# Patient Record
Sex: FEMALE | Race: BLACK OR AFRICAN AMERICAN | Employment: OTHER | URBAN - METROPOLITAN AREA
[De-identification: names, ages, dates, MRNs, and addresses within clinical notes are randomized per-mention and may not be internally consistent; named-entity substitution may affect disease eponyms.]

---

## 2017-08-10 DIAGNOSIS — I10 ESSENTIAL HYPERTENSION: ICD-10-CM

## 2017-08-10 RX ORDER — CARVEDILOL 3.12 MG/1
TABLET ORAL
Qty: 60 TAB | Refills: 10 | Status: SHIPPED | OUTPATIENT
Start: 2017-08-10 | End: 2018-11-08 | Stop reason: SDUPTHER

## 2017-08-17 ENCOUNTER — OFFICE VISIT (OUTPATIENT)
Dept: CARDIOLOGY CLINIC | Age: 72
End: 2017-08-17

## 2017-08-17 VITALS
BODY MASS INDEX: 26.24 KG/M2 | SYSTOLIC BLOOD PRESSURE: 160 MMHG | DIASTOLIC BLOOD PRESSURE: 98 MMHG | WEIGHT: 139 LBS | OXYGEN SATURATION: 96 % | HEIGHT: 61 IN | HEART RATE: 82 BPM

## 2017-08-17 DIAGNOSIS — I10 ESSENTIAL HYPERTENSION: Primary | ICD-10-CM

## 2017-08-17 RX ORDER — METFORMIN HYDROCHLORIDE 500 MG/1
TABLET ORAL
Refills: 0 | COMMUNITY
Start: 2017-07-09 | End: 2019-11-08

## 2017-08-17 NOTE — PROGRESS NOTES
1. Have you been to the ER, urgent care clinic since your last visit? Hospitalized since your last visit? No    2. Have you seen or consulted any other health care providers outside of the [address] [place] [address] since your last visit? Include any pap smears or colon screening.  No

## 2017-08-17 NOTE — PROGRESS NOTES
As you know, Hammad Orozco is a 67 y.o. female with a history of coronary artery disease, status post inferior ST elevation MI and RCA drug-eluting stent in October 2011. She also has borderline diabetes, hyperlipidemia and hypertension. Ms. Sarah Butler is here today for follow up appointment. She denies any symptoms to suggest angina or heart failure. She denies any palpitations, presyncope or syncope. She takes her medications regularly. She says her blood pressure at home usually runs normal.  She has been started on Metformin because of diabetes. MH:  Past Medical History:   Diagnosis Date    Borderline diabetes     Diet controlled    CAD (coronary artery disease) 10/2011    S/P RCA GOLDIE (3.0 X 18 mm Xience) 10/2011    HLD (hyperlipidemia)     HTN (hypertension)     STEMI (ST elevation myocardial infarction) ([facility] [address]     Inferior STEMI (10/2011)     PSH:  Past Surgical History:   Procedure Laterality Date    CARDIAC CATHETERIZATION  10/27/2011    L heart cath;LAD 10%-20% luminal irreg; 99%stenosis of RCA using 3.0 x18-mm Xience     MEDS:  Current Outpatient Prescriptions   Medication Sig Dispense Refill    metFORMIN (GLUCOPHAGE) 500 mg tablet   0    carvedilol (COREG) 3.125 mg tablet take 1 tablet by mouth twice a day with meals 60 Tab 10    nitroglycerin (NITROSTAT) 0.4 mg SL tablet 1 Tab by SubLINGual route every five (5) minutes as needed for Chest Pain. 1 Bottle 1    atorvastatin (LIPITOR) 40 mg tablet Take 1 Tab by mouth daily. 30 Tab 11    amLODIPine (NORVASC) 5 mg tablet Take 1 Tab by mouth daily. 30 Tab 9    aspirin delayed-release 81 mg tablet Take  by mouth daily.          Allergies and Sensitivities:  Allergies   Allergen Reactions    Pcn [Penicillins] Unknown (comments)     Family History:  Family History   Problem Relation Age of Onset    Heart Attack Mother     Heart Attack Father      Social History:  Social History   Substance Use Topics    Smoking status: Former Smoker Packs/day: 0.50     Years: 30.00     Quit date: 10/16/2011    Smokeless tobacco: Never Used    Alcohol use No     Review of Systems:  As above in HPI, otherwise 10 point review negative. Physical Exam:  BP Readings from Last 3 Encounters:   08/17/17 (!) 160/98   08/16/16 139/83   08/18/15 140/90       Pulse Readings from Last 3 Encounters:   08/17/17 82   08/16/16 89   08/18/15 85       Wt Readings from Last 3 Encounters:   08/17/17 139 lb (63 kg)   08/16/16 139 lb (63 kg)   08/18/15 136 lb (61.7 kg)     Constitutional: Oriented to person, place, and time. HENT: Head: Normocephalic and atraumatic. Neck: No JVD present. Cardiovascular: Regular rhythm. No murmur, gallop or rubs appriciated  Lung[de-identified] Breath sounds normal. No respiratory distress. No ronchi or rales appriciated  Abdominal: No tenderness. No rebound and no guarding. Musculoskeletal: There is no edema. No cynosis    Review of Data:  EKG:   Sinus rhythm at the rate of 75 beats per minute. There was Q wave in lead III. No dynamic ST-T changes of ischemia. LABS:   Lab Results   Component Value Date/Time    Sodium 143 02/22/2012 12:00 AM    Potassium 4.6 02/22/2012 12:00 AM    Chloride 107 02/22/2012 12:00 AM    CO2 26 02/22/2012 12:00 AM    Glucose 108 02/22/2012 12:00 AM    BUN 11 02/22/2012 12:00 AM    Creatinine 0.57 02/22/2012 12:00 AM       Lab Results   Component Value Date/Time    Cholesterol, total 183 10/28/2011 01:20 AM    HDL Cholesterol 36 10/28/2011 01:20 AM    LDL, calculated 121.4 10/28/2011 01:20 AM    Triglyceride 128 10/28/2011 01:20 AM    CHOL/HDL Ratio 5.1 10/28/2011 01:20 AM       No results found for: GPT    Lab Results   Component Value Date/Time    Hemoglobin A1c 6.4 10/28/2011 01:20 AM     FLP (12/2011) at PCP  / TG 94, LDL 62, HDL 42    ECHO:(10/2011)  Findings suggest single vessel coronary artery disease in the territory of  the right coronary artery. There was no significant valvular heart disease. SUMMARY:  Left ventricle: Size was normal. Systolic function was normal. Ejection  fraction was estimated in the range of 60 % to 65 %. There was hypokinesis  of the basal inferoseptal and basal inferior wall(s). Wall thickness was  mildly increased. Right ventricle: Systolic pressure was at the upper limits of normal.    Estimated peak pressure was in the range of 25 mmHg to 30 mmHg. Left atrium: The atrium was mildly dilated. Pericardium: No significant pericardial effusion identified. Features were  not consistent with tamponade physiology. CATHETERIZATION:(10/2011)  1. Left Main: Long, but angiographically normal.    2. LAD: 10% to 20% luminal irregularities into the proximal mid portion. 3. LCX: Very tortuous vessel, but no significant stenosis. 4. RCA: A proximal subtotal 99% occlusion with angiographic concern of  dissection. Successful percutaneous coronary intervention and stenting of  the proximal 99% RCA stenosis using 3.0 x 18-mm Xience drug eluting stent,  followed by a 3.25 x 15 mm noncompliant post dilation. Repeat post dilation with a 4.0 x 12-mm compliant balloon at 14 atmospheres. No angiographic  stenosis or dissection post procedure. Impression / Plan:  Ms. Merissa Moreno is a pleasant, 67 y.o. female with history of coronary artery disease, hypertension, hyperlipidemia, and borderline diabetes. Coronary artery disease:  She has an inferior STEMI in October of 2011 requiring RCA drug-eluting stent. No angina. Continue same. She is on ASA, statin and BB. No use of S/L Since last time. Hypertension:  Blood pressure is slightly up today. On coreg and BB. DW  and she had hyperkalemia from lisinopril in past.  She will see her in 2-3 weeks and if BP still up, dose will be titrated    Hyperlipidemia:  Defer to primary care Continue same for now. She is on atorvastatin 40 mg daily. No side effect of medication. This plan was discussed with Ms. Merissa Moreno in detail, who is in agreement. Thank you Dr. Bita Sagastume for allowing me to participate in the patient's care. Feel free to call me with any questions or concerns.

## 2017-08-17 NOTE — MR AVS SNAPSHOT
Visit Information Date & Time Provider Department Dept. Phone Encounter #  
 8/17/2017  8:45 AM Selvin Payne MD [address] [place] [address] Specialist at [facility] [phone] [id number] Follow-up Instructions Return in about 1 year (around 8/17/2018). Upcoming Health Maintenance Date Due DTaP/Tdap/Td series (1 - Tdap) 1/8/1965 BREAST CANCER SCRN MAMMOGRAM 1/8/1994 FOBT Q 1 YEAR AGE 50-75 1/8/1994 ZOSTER VACCINE AGE 60> 11/8/2003 GLAUCOMA SCREENING Q2Y 1/8/2009 OSTEOPOROSIS SCREENING (DEXA) 1/8/2009 Pneumococcal 65+ Low/Medium Risk (1 of 2 - PCV13) 1/8/2009 MEDICARE YEARLY EXAM 1/8/2009 INFLUENZA AGE 9 TO ADULT 8/1/2017 Allergies as of 8/17/2017  Review Complete On: 8/17/2017 By: Tabby Kaur LPN Severity Noted Reaction Type Reactions Pcn [Penicillins]  11/16/2011    Unknown (comments) Current Immunizations  Never Reviewed No immunizations on file. Not reviewed this visit You Were Diagnosed With   
  
 Codes Comments Essential hypertension    -  Primary ICD-10-CM: I10 
ICD-9-CM: 401.9 Vitals BP Pulse Height(growth percentile) Weight(growth percentile) SpO2 BMI  
 (!) 160/98 82 5' 1\" (1.549 m) 139 lb (63 kg) 96% 26.26 kg/m2 OB Status Smoking Status Menopause Former Smoker BMI and BSA Data Body Mass Index Body Surface Area  
 26.26 kg/m 2 1.65 m 2 Preferred Pharmacy Pharmacy Name Phone [address] [address] [address] [place] [address] [phone] Your Updated Medication List  
  
   
This list is accurate as of: 8/17/17  9:16 AM.  Always use your most recent med list. amLODIPine 5 mg tablet Commonly known as:  Blake Alida Take 1 Tab by mouth daily. aspirin delayed-release 81 mg tablet Take  by mouth daily. atorvastatin 40 mg tablet Commonly known as:  LIPITOR Take 1 Tab by mouth daily. carvedilol 3.125 mg tablet Commonly known as:  COREG  
take 1 tablet by mouth twice a day with meals  
  
 metFORMIN 500 mg tablet Commonly known as:  GLUCOPHAGE  
  
 nitroglycerin 0.4 mg SL tablet Commonly known as:  NITROSTAT  
1 Tab by SubLINGual route every five (5) minutes as needed for Chest Pain. We Performed the Following AMB POC EKG ROUTINE W/ 12 LEADS, INTER & REP [36603 CPT(R)] Follow-up Instructions Return in about 1 year (around 8/17/2018). Introducing [facility] & HEALTH SERVICES! Dear Annette Blankenship: 
Thank you for requesting a [employer] account. Our records indicate that you have previously registered for a [employer] account but its currently inactive. Please call our [employer] support line at [phone]. Additional Information If you have questions, please visit the Frequently Asked Questions section of the [employer] website at [email or web address] Remember, [employer] is NOT to be used for urgent needs. For medical emergencies, dial 911. Now available from your iPhone and Android! Please provide this summary of care documentation to your next provider. Your primary care clinician is listed as Liv Lomax. If you have any questions after today's visit, please call [phone].

## 2017-08-28 RX ORDER — ATORVASTATIN CALCIUM 40 MG/1
TABLET, FILM COATED ORAL
Qty: 30 TAB | Refills: 11 | Status: SHIPPED | OUTPATIENT
Start: 2017-08-28 | End: 2018-04-17 | Stop reason: SDUPTHER

## 2018-02-15 ENCOUNTER — OFFICE VISIT (OUTPATIENT)
Dept: CARDIOLOGY CLINIC | Age: 73
End: 2018-02-15

## 2018-02-15 VITALS
HEART RATE: 97 BPM | DIASTOLIC BLOOD PRESSURE: 82 MMHG | OXYGEN SATURATION: 96 % | SYSTOLIC BLOOD PRESSURE: 148 MMHG | BODY MASS INDEX: 26.06 KG/M2 | WEIGHT: 138 LBS | HEIGHT: 61 IN

## 2018-02-15 DIAGNOSIS — I25.10 CORONARY ARTERY DISEASE DUE TO LIPID RICH PLAQUE: Primary | ICD-10-CM

## 2018-02-15 DIAGNOSIS — I10 ESSENTIAL HYPERTENSION WITH GOAL BLOOD PRESSURE LESS THAN 140/90: ICD-10-CM

## 2018-02-15 DIAGNOSIS — Z01.818 PRE-OP EVALUATION: ICD-10-CM

## 2018-02-15 DIAGNOSIS — E78.00 PURE HYPERCHOLESTEROLEMIA: ICD-10-CM

## 2018-02-15 DIAGNOSIS — I25.83 CORONARY ARTERY DISEASE DUE TO LIPID RICH PLAQUE: Primary | ICD-10-CM

## 2018-02-15 RX ORDER — AMLODIPINE BESYLATE 10 MG/1
TABLET ORAL
COMMUNITY
Start: 2018-02-14 | End: 2020-11-09 | Stop reason: SDUPTHER

## 2018-02-15 NOTE — PROGRESS NOTES
As you know, Mechelle Rodriguez is a 75 y.o. female with a history of coronary artery disease, status post inferior ST elevation MI and RCA drug-eluting stent in October 2011. She also has borderline diabetes, hyperlipidemia and hypertension. Ms. Eli Pisano is here today for follow up appointment. She denies any symptoms to suggest angina or heart failure. She denies any palpitations, presyncope or syncope. She takes her medications regularly. Was diagnosed with left breast ca recently, biopsy and MRI proven  Awaiting surgery next week  Denies any cardiac symptoms what so ever and no use of S/L NTG since last time. Able to climb 2/3 flights without any symptoms. MH:  Past Medical History:   Diagnosis Date    Borderline diabetes     Diet controlled    CAD (coronary artery disease) 10/2011    S/P RCA GOLDIE (3.0 X 18 mm Xience) 10/2011    HLD (hyperlipidemia)     HTN (hypertension)     Invasive ductal carcinoma of breast ([facility] [address] 02/2018    Left breast    STEMI (ST elevation myocardial infarction) ([facility] [address]     Inferior STEMI (10/2011)     PSH:  Past Surgical History:   Procedure Laterality Date    CARDIAC CATHETERIZATION  10/27/2011    L heart cath;LAD 10%-20% luminal irreg; 99%stenosis of RCA using 3.0 x18-mm Xience     MEDS:  Current Outpatient Prescriptions   Medication Sig Dispense Refill    amLODIPine (NORVASC) 10 mg tablet       atorvastatin (LIPITOR) 40 mg tablet take 1 tablet by mouth once daily 30 Tab 11    metFORMIN (GLUCOPHAGE) 500 mg tablet   0    carvedilol (COREG) 3.125 mg tablet take 1 tablet by mouth twice a day with meals 60 Tab 10    nitroglycerin (NITROSTAT) 0.4 mg SL tablet 1 Tab by SubLINGual route every five (5) minutes as needed for Chest Pain. 1 Bottle 1    aspirin delayed-release 81 mg tablet Take  by mouth daily.          Allergies and Sensitivities:  Allergies   Allergen Reactions    Pcn [Penicillins] Unknown (comments)     Family History:  Family History   Problem Relation Age of Onset    Heart Attack Mother     Heart Attack Father      Social History:  Social History   Substance Use Topics    Smoking status: Former Smoker     Packs/day: 0.50     Years: 30.00     Quit date: 10/16/2011    Smokeless tobacco: Never Used    Alcohol use No     Review of Systems:  As above in HPI, otherwise 10 point review negative. Physical Exam:  BP Readings from Last 3 Encounters:   02/15/18 148/82   08/17/17 (!) 160/98   08/16/16 139/83       Pulse Readings from Last 3 Encounters:   02/15/18 97   08/17/17 82   08/16/16 89       Wt Readings from Last 3 Encounters:   02/15/18 138 lb (62.6 kg)   08/17/17 139 lb (63 kg)   08/16/16 139 lb (63 kg)     Constitutional: Oriented to person, place, and time. HENT: Head: Normocephalic and atraumatic. Neck: No JVD present. Cardiovascular: Regular rhythm. No murmur, gallop or rubs appriciated  Lung[de-identified] Breath sounds normal. No respiratory distress. No ronchi or rales appriciated  Abdominal: No tenderness. No rebound and no guarding. Musculoskeletal: There is no edema. No cynosis    Review of Data:  EKG:   Sinus rhythm at the rate of 75 beats per minute. There was Q wave in lead III. No dynamic ST-T changes of ischemia.     LABS:   Lab Results   Component Value Date/Time    Sodium 143 02/22/2012 12:00 AM    Potassium 4.6 02/22/2012 12:00 AM    Chloride 107 02/22/2012 12:00 AM    CO2 26 02/22/2012 12:00 AM    Glucose 108 (H) 02/22/2012 12:00 AM    BUN 11 02/22/2012 12:00 AM    Creatinine 0.57 02/22/2012 12:00 AM       Lab Results   Component Value Date/Time    Cholesterol, total 183 10/28/2011 01:20 AM    HDL Cholesterol 36 (L) 10/28/2011 01:20 AM    LDL, calculated 121.4 (H) 10/28/2011 01:20 AM    Triglyceride 128 10/28/2011 01:20 AM    CHOL/HDL Ratio 5.1 (H) 10/28/2011 01:20 AM       No results found for: GPT    Lab Results   Component Value Date/Time    Hemoglobin A1c 6.4 (H) 10/28/2011 01:20 AM     FLP (12/2011) at PCP  / TG 94, LDL 62, HDL 43    ECHO:(10/2011)  Findings suggest single vessel coronary artery disease in the territory of  the right coronary artery. There was no significant valvular heart disease. SUMMARY:  Left ventricle: Size was normal. Systolic function was normal. Ejection  fraction was estimated in the range of 60 % to 65 %. There was hypokinesis  of the basal inferoseptal and basal inferior wall(s). Wall thickness was  mildly increased. Right ventricle: Systolic pressure was at the upper limits of normal.    Estimated peak pressure was in the range of 25 mmHg to 30 mmHg. Left atrium: The atrium was mildly dilated. Pericardium: No significant pericardial effusion identified. Features were  not consistent with tamponade physiology. CATHETERIZATION:(10/2011)  1. Left Main: Long, but angiographically normal.    2. LAD: 10% to 20% luminal irregularities into the proximal mid portion. 3. LCX: Very tortuous vessel, but no significant stenosis. 4. RCA: A proximal subtotal 99% occlusion with angiographic concern of  dissection. Successful percutaneous coronary intervention and stenting of  the proximal 99% RCA stenosis using 3.0 x 18-mm Xience drug eluting stent,  followed by a 3.25 x 15 mm noncompliant post dilation. Repeat post dilation with a 4.0 x 12-mm compliant balloon at 14 atmospheres. No angiographic  stenosis or dissection post procedure. Impression / Plan:  Ms. Brandyn Gusman is a pleasant, 75 y.o. female with history of coronary artery disease, hypertension, hyperlipidemia, and borderline diabetes. Coronary artery disease:  She has an inferior STEMI in October of 2011 requiring RCA drug-eluting stent. No angina. Continue same. She is on ASA, amlodipine statin and BB. No use of S/L Since last time. Continue same    Hypertension:  Blood pressure is stable.  On coreg and amlodipine  DW  and she had hyperkalemia from lisinopril in past. Not on ACE-I since then    Hyperlipidemia:  Defer to primary care Continue same for now. She is on atorvastatin 40 mg daily. No side effect of medication. Being checked by PCP regularly    L Breast cancer:  Diagnosed with Invasive ductal ca of left breast in 01/18, MRI and biopsy proven  Awaiting surgery next week  Currently  No symptoms to suggest decompensated heart failure or unstable coronary syndrome. No fluid overload on exam. EKG with old inferior infarct. Functional status > 4 METS   In absence of any symptoms, no further cardiac work up is needed. She will be at intermediate risk for any complications. Continue cardiac medications britni-operatively. This plan was discussed with Ms. Brandyn Gusman in detail, who is in agreement. Thank you Dr. Thiago Chisholm for allowing me to participate in the patient's care. Feel free to call me with any questions or concerns.

## 2018-02-15 NOTE — PROGRESS NOTES
1. Have you been to the ER, urgent care clinic since your last visit? Hospitalized since your last visit? No    2. Have you seen or consulted any other health care providers outside of the [address] [place] [address] since your last visit? Include any pap smears or colon screening.  No

## 2018-02-15 NOTE — MR AVS SNAPSHOT
Alexandro Naseem 
 
 
 [address] [place] [address] [address] 
[phone] Patient: Julieta Cotton MRN: [id number] LIZBETH:[birth year] Visit Information Date & Time Provider Department Dept. Phone Encounter #  
 2/15/2018  1:15 PM Selvin Momin MD [address] [place] [address] Specialist at [facility] [phone] [id number] Follow-up Instructions Return in about 6 months (around 8/15/2018). Your Appointments 8/16/2018  9:00 AM  
Follow Up with Nick Franco MD  
Cardio Specialist at [facility] [facility]) Appt Note: 6 months [address]  
  
   
 [address] Upcoming Health Maintenance Date Due DTaP/Tdap/Td series (1 - Tdap) 1/8/1965 BREAST CANCER SCRN MAMMOGRAM 1/8/1994 FOBT Q 1 YEAR AGE 50-75 1/8/1994 ZOSTER VACCINE AGE 60> 11/8/2003 GLAUCOMA SCREENING Q2Y 1/8/2009 OSTEOPOROSIS SCREENING (DEXA) 1/8/2009 Pneumococcal 65+ Low/Medium Risk (1 of 2 - PCV13) 1/8/2009 MEDICARE YEARLY EXAM 1/8/2009 Influenza Age 5 to Adult 8/1/2017 Allergies as of 2/15/2018  Review Complete On: 2/15/2018 By: Kyra Pillai RN Severity Noted Reaction Type Reactions Pcn [Penicillins]  11/16/2011    Unknown (comments) Current Immunizations  Never Reviewed No immunizations on file. Not reviewed this visit Vitals BP Pulse Height(growth percentile) Weight(growth percentile) SpO2 BMI  
 148/82 97 5' 1\" (1.549 m) 138 lb (62.6 kg) 96% 26.07 kg/m2 OB Status Smoking Status Menopause Former Smoker BMI and BSA Data Body Mass Index Body Surface Area 26.07 kg/m 2 1.64 m 2 Preferred Pharmacy Pharmacy Name Phone [address] [facility], [id number] [place] [address] [address] [facility] [phone] Your Updated Medication List  
  
   
 This list is accurate as of: 2/15/18  1:30 PM.  Always use your most recent med list. amLODIPine 10 mg tablet Commonly known as:  NORVASC  
  
 aspirin delayed-release 81 mg tablet Take  by mouth daily. atorvastatin 40 mg tablet Commonly known as:  LIPITOR  
take 1 tablet by mouth once daily  
  
 carvedilol 3.125 mg tablet Commonly known as:  COREG  
take 1 tablet by mouth twice a day with meals  
  
 metFORMIN 500 mg tablet Commonly known as:  GLUCOPHAGE  
  
 nitroglycerin 0.4 mg SL tablet Commonly known as:  NITROSTAT  
1 Tab by SubLINGual route every five (5) minutes as needed for Chest Pain. Follow-up Instructions Return in about 6 months (around 8/15/2018). Introducing [facility] & HEALTH SERVICES! Dear Jamil Guerrero: 
Thank you for requesting a [employer] account. Our records indicate that you have previously registered for a [employer] account but its currently inactive. Please call our [employer] support line at [phone]. Additional Information If you have questions, please visit the Frequently Asked Questions section of the [employer] website at [email or web address] Remember, [employer] is NOT to be used for urgent needs. For medical emergencies, dial 911. Now available from your iPhone and Android! Please provide this summary of care documentation to your next provider. Your primary care clinician is listed as Ramirez Kowalski. If you have any questions after today's visit, please call [phone].

## 2018-02-15 NOTE — LETTER
Patient:  Leobardo Zelaya YOB: 1945 Date of Visit: 2/15/2018 Dear Joseph Kc MD 
[id number] [facility] [id number] VIA Facsimile: [phone] 
 : As you know, Jett Walker is a 75 y.o. female with a history of coronary artery disease, status post inferior ST elevation MI and RCA drug-eluting stent in October 2011. She also has borderline diabetes, hyperlipidemia and hypertension. Ms. Cinda Velez is here today for follow up appointment. She denies any symptoms to suggest angina or heart failure. She denies any palpitations, presyncope or syncope. She takes her medications regularly. Was diagnosed with left breast ca recently, biopsy and MRI proven Awaiting surgery next week Denies any cardiac symptoms what so ever and no use of S/L NTG since last time. Able to climb 2/3 flights without any symptoms. MH: 
Past Medical History:  
Diagnosis Date  Borderline diabetes Diet controlled  CAD (coronary artery disease) 10/2011 S/P RCA GOLDIE (3.0 X 18 mm Xience) 10/2011  HLD (hyperlipidemia)  HTN (hypertension)  Invasive ductal carcinoma of breast ([facility] [place] [address] 02/2018 Left breast  
 STEMI (ST elevation myocardial infarction) ([facility] [place] [address] Inferior STEMI (10/2011) PSH: 
Past Surgical History:  
Procedure Laterality Date  CARDIAC CATHETERIZATION  10/27/2011 L heart cath;LAD 10%-20% luminal irreg; 99%stenosis of RCA using 3.0 x18-mm Xience MEDS: 
Current Outpatient Prescriptions Medication Sig Dispense Refill  amLODIPine (NORVASC) 10 mg tablet  atorvastatin (LIPITOR) 40 mg tablet take 1 tablet by mouth once daily 30 Tab 11  
 metFORMIN (GLUCOPHAGE) 500 mg tablet   0  
 carvedilol (COREG) 3.125 mg tablet take 1 tablet by mouth twice a day with meals 60 Tab 10  
 nitroglycerin (NITROSTAT) 0.4 mg SL tablet 1 Tab by SubLINGual route every five (5) minutes as needed for Chest Pain.  1 Bottle 1  
  aspirin delayed-release 81 mg tablet Take  by mouth daily. Allergies and Sensitivities: 
Allergies Allergen Reactions  Pcn [Penicillins] Unknown (comments) Family History: 
Family History Problem Relation Age of Onset  Heart Attack Mother  Heart Attack Father Social History: 
Social History Substance Use Topics  Smoking status: Former Smoker Packs/day: 0.50 Years: 30.00 Quit date: 10/16/2011  Smokeless tobacco: Never Used  Alcohol use No  
 
Review of Systems: As above in HPI, otherwise 10 point review negative. Physical Exam: 
BP Readings from Last 3 Encounters:  
02/15/18 148/82  
08/17/17 (!) 160/98  
08/16/16 139/83 Pulse Readings from Last 3 Encounters:  
02/15/18 97  
08/17/17 82  
08/16/16 89 Wt Readings from Last 3 Encounters:  
02/15/18 138 lb (62.6 kg) 08/17/17 139 lb (63 kg) 08/16/16 139 lb (63 kg) Constitutional: Oriented to person, place, and time. HENT: Head: Normocephalic and atraumatic. Neck: No JVD present. Cardiovascular: Regular rhythm. No murmur, gallop or rubs appriciated Lung[de-identified] Breath sounds normal. No respiratory distress. No ronchi or rales appriciated Abdominal: No tenderness. No rebound and no guarding. Musculoskeletal: There is no edema. No cynosis Review of Data: 
EKG:  
Sinus rhythm at the rate of 75 beats per minute. There was Q wave in lead III. No dynamic ST-T changes of ischemia. LABS:  
Lab Results Component Value Date/Time Sodium 143 02/22/2012 12:00 AM  
 Potassium 4.6 02/22/2012 12:00 AM  
 Chloride 107 02/22/2012 12:00 AM  
 CO2 26 02/22/2012 12:00 AM  
 Glucose 108 (H) 02/22/2012 12:00 AM  
 BUN 11 02/22/2012 12:00 AM  
 Creatinine 0.57 02/22/2012 12:00 AM  
 
 
Lab Results Component Value Date/Time  Cholesterol, total 183 10/28/2011 01:20 AM  
 HDL Cholesterol 36 (L) 10/28/2011 01:20 AM  
 LDL, calculated 121.4 (H) 10/28/2011 01:20 AM  
 Triglyceride 128 10/28/2011 01:20 AM  
 CHOL/HDL Ratio 5.1 (H) 10/28/2011 01:20 AM  
 
 
No results found for: GPT Lab Results Component Value Date/Time Hemoglobin A1c 6.4 (H) 10/28/2011 01:20 AM  
 
FLP (12/2011) at PCP 
/ TG 94, LDL 62, HDL 42 ECHO:(10/2011) Findings suggest single vessel coronary artery disease in the territory of  the right coronary artery. There was no significant valvular heart disease. SUMMARY:  Left ventricle: Size was normal. Systolic function was normal. Ejection  fraction was estimated in the range of 60 % to 65 %. There was hypokinesis  of the basal inferoseptal and basal inferior wall(s). Wall thickness was  mildly increased. Right ventricle: Systolic pressure was at the upper limits of normal.   
Estimated peak pressure was in the range of 25 mmHg to 30 mmHg. Left atrium: The atrium was mildly dilated. Pericardium: No significant pericardial effusion identified. Features were  not consistent with tamponade physiology. CATHETERIZATION:(10/2011) 1. Left Main: Long, but angiographically normal.   
2. LAD: 10% to 20% luminal irregularities into the proximal mid portion. 3. LCX: Very tortuous vessel, but no significant stenosis. 4. RCA: A proximal subtotal 99% occlusion with angiographic concern of  dissection. Successful percutaneous coronary intervention and stenting of  the proximal 99% RCA stenosis using 3.0 x 18-mm Xience drug eluting stent,  followed by a 3.25 x 15 mm noncompliant post dilation. Repeat post dilation with a 4.0 x 12-mm compliant balloon at 14 atmospheres. No angiographic  stenosis or dissection post procedure. Impression / Plan: Ms. Bull Fierro is a pleasant, 75 y.o. female with history of coronary artery disease, hypertension, hyperlipidemia, and borderline diabetes. Coronary artery disease:  She has an inferior STEMI in October of 2011 requiring RCA drug-eluting stent. No angina. Continue same.  She is on ASA, amlodipine statin and BB. No use of S/L Since last time. Continue same Hypertension:  Blood pressure is stable. On coreg and amlodipine DW  and she had hyperkalemia from lisinopril in past. Not on ACE-I since then Hyperlipidemia:  Defer to primary care Continue same for now. She is on atorvastatin 40 mg daily. No side effect of medication. Being checked by PCP regularly L Breast cancer: 
Diagnosed with Invasive ductal ca of left breast in 01/18, MRI and biopsy proven Awaiting surgery next week Currently  No symptoms to suggest decompensated heart failure or unstable coronary syndrome. No fluid overload on exam. EKG with old inferior infarct. Functional status > 4 METS In absence of any symptoms, no further cardiac work up is needed. She will be at intermediate risk for any complications. Continue cardiac medications britni-operatively. This plan was discussed with Ms. Belle Funk in detail, who is in agreement. Thank you Dr. Anne Dumont for allowing me to participate in the patient's care. Feel free to call me with any questions or concerns. Sincerely, Israel Chavarria MD

## 2018-04-05 NOTE — COMMUNICATION BODY
As you know, William Ross is a 75 y.o. female with a history of coronary artery disease, status post inferior ST elevation MI and RCA drug-eluting stent in October 2011. She also has borderline diabetes, hyperlipidemia and hypertension. Ms. Ladi Dyer is here today for follow up appointment. She denies any symptoms to suggest angina or heart failure. She denies any palpitations, presyncope or syncope. She takes her medications regularly. Was diagnosed with left breast ca recently, biopsy and MRI proven  Awaiting surgery next week  Denies any cardiac symptoms what so ever and no use of S/L NTG since last time. Able to climb 2/3 flights without any symptoms. MH:  Past Medical History:   Diagnosis Date    Borderline diabetes     Diet controlled    CAD (coronary artery disease) 10/2011    S/P RCA GOLDIE (3.0 X 18 mm Xience) 10/2011    HLD (hyperlipidemia)     HTN (hypertension)     Invasive ductal carcinoma of breast ([facility] [address] 02/2018    Left breast    STEMI (ST elevation myocardial infarction) ([facility] [address]     Inferior STEMI (10/2011)     PSH:  Past Surgical History:   Procedure Laterality Date    CARDIAC CATHETERIZATION  10/27/2011    L heart cath;LAD 10%-20% luminal irreg; 99%stenosis of RCA using 3.0 x18-mm Xience     MEDS:  Current Outpatient Prescriptions   Medication Sig Dispense Refill    amLODIPine (NORVASC) 10 mg tablet       atorvastatin (LIPITOR) 40 mg tablet take 1 tablet by mouth once daily 30 Tab 11    metFORMIN (GLUCOPHAGE) 500 mg tablet   0    carvedilol (COREG) 3.125 mg tablet take 1 tablet by mouth twice a day with meals 60 Tab 10    nitroglycerin (NITROSTAT) 0.4 mg SL tablet 1 Tab by SubLINGual route every five (5) minutes as needed for Chest Pain. 1 Bottle 1    aspirin delayed-release 81 mg tablet Take  by mouth daily.          Allergies and Sensitivities:  Allergies   Allergen Reactions    Pcn [Penicillins] Unknown (comments)     Family History:  Family History   Problem Relation Age of Onset    Heart Attack Mother     Heart Attack Father      Social History:  Social History   Substance Use Topics    Smoking status: Former Smoker     Packs/day: 0.50     Years: 30.00     Quit date: 10/16/2011    Smokeless tobacco: Never Used    Alcohol use No     Review of Systems:  As above in HPI, otherwise 10 point review negative. Physical Exam:  BP Readings from Last 3 Encounters:   02/15/18 148/82   08/17/17 (!) 160/98   08/16/16 139/83       Pulse Readings from Last 3 Encounters:   02/15/18 97   08/17/17 82   08/16/16 89       Wt Readings from Last 3 Encounters:   02/15/18 138 lb (62.6 kg)   08/17/17 139 lb (63 kg)   08/16/16 139 lb (63 kg)     Constitutional: Oriented to person, place, and time. HENT: Head: Normocephalic and atraumatic. Neck: No JVD present. Cardiovascular: Regular rhythm. No murmur, gallop or rubs appriciated  Lung[de-identified] Breath sounds normal. No respiratory distress. No ronchi or rales appriciated  Abdominal: No tenderness. No rebound and no guarding. Musculoskeletal: There is no edema. No cynosis    Review of Data:  EKG:   Sinus rhythm at the rate of 75 beats per minute. There was Q wave in lead III. No dynamic ST-T changes of ischemia.     LABS:   Lab Results   Component Value Date/Time    Sodium 143 02/22/2012 12:00 AM    Potassium 4.6 02/22/2012 12:00 AM    Chloride 107 02/22/2012 12:00 AM    CO2 26 02/22/2012 12:00 AM    Glucose 108 (H) 02/22/2012 12:00 AM    BUN 11 02/22/2012 12:00 AM    Creatinine 0.57 02/22/2012 12:00 AM       Lab Results   Component Value Date/Time    Cholesterol, total 183 10/28/2011 01:20 AM    HDL Cholesterol 36 (L) 10/28/2011 01:20 AM    LDL, calculated 121.4 (H) 10/28/2011 01:20 AM    Triglyceride 128 10/28/2011 01:20 AM    CHOL/HDL Ratio 5.1 (H) 10/28/2011 01:20 AM       No results found for: GPT    Lab Results   Component Value Date/Time    Hemoglobin A1c 6.4 (H) 10/28/2011 01:20 AM     FLP (12/2011) at PCP  / TG 94, LDL 62, HDL 43    ECHO:(10/2011)  Findings suggest single vessel coronary artery disease in the territory of  the right coronary artery. There was no significant valvular heart disease. SUMMARY:  Left ventricle: Size was normal. Systolic function was normal. Ejection  fraction was estimated in the range of 60 % to 65 %. There was hypokinesis  of the basal inferoseptal and basal inferior wall(s). Wall thickness was  mildly increased. Right ventricle: Systolic pressure was at the upper limits of normal.    Estimated peak pressure was in the range of 25 mmHg to 30 mmHg. Left atrium: The atrium was mildly dilated. Pericardium: No significant pericardial effusion identified. Features were  not consistent with tamponade physiology. CATHETERIZATION:(10/2011)  1. Left Main: Long, but angiographically normal.    2. LAD: 10% to 20% luminal irregularities into the proximal mid portion. 3. LCX: Very tortuous vessel, but no significant stenosis. 4. RCA: A proximal subtotal 99% occlusion with angiographic concern of  dissection. Successful percutaneous coronary intervention and stenting of  the proximal 99% RCA stenosis using 3.0 x 18-mm Xience drug eluting stent,  followed by a 3.25 x 15 mm noncompliant post dilation. Repeat post dilation with a 4.0 x 12-mm compliant balloon at 14 atmospheres. No angiographic  stenosis or dissection post procedure. Impression / Plan:  Ms. Merlin Bautista is a pleasant, 75 y.o. female with history of coronary artery disease, hypertension, hyperlipidemia, and borderline diabetes. Coronary artery disease:  She has an inferior STEMI in October of 2011 requiring RCA drug-eluting stent. No angina. Continue same. She is on ASA, amlodipine statin and BB. No use of S/L Since last time. Continue same    Hypertension:  Blood pressure is stable.  On coreg and amlodipine  DW  and she had hyperkalemia from lisinopril in past. Not on ACE-I since then    Hyperlipidemia:  Defer to primary care Continue same for now. She is on atorvastatin 40 mg daily. No side effect of medication. Being checked by PCP regularly    L Breast cancer:  Diagnosed with Invasive ductal ca of left breast in 01/18, MRI and biopsy proven  Awaiting surgery next week  Currently  No symptoms to suggest decompensated heart failure or unstable coronary syndrome. No fluid overload on exam. EKG with old inferior infarct. Functional status > 4 METS   In absence of any symptoms, no further cardiac work up is needed. She will be at intermediate risk for any complications. Continue cardiac medications britni-operatively. This plan was discussed with Ms. Janice Chisholm in detail, who is in agreement. Thank you Dr. Nava Osman for allowing me to participate in the patient's care. Feel free to call me with any questions or concerns.

## 2018-04-18 ENCOUNTER — TELEPHONE (OUTPATIENT)
Dept: CARDIOLOGY CLINIC | Age: 73
End: 2018-04-18

## 2018-04-18 RX ORDER — ATORVASTATIN CALCIUM 40 MG/1
TABLET, FILM COATED ORAL
Qty: 30 TAB | Refills: 11 | Status: SHIPPED | OUTPATIENT
Start: 2018-04-18 | End: 2018-04-18 | Stop reason: SDUPTHER

## 2018-04-18 NOTE — TELEPHONE ENCOUNTER
Pt is requesting her Lipitor be sent to [facility]. States she has requested this be done multiple times now. Looks like the script was sent to the incorrect pharmacy previously. Patient states she will check with pharmacy in about an hour and then will call back to office if it is not there.

## 2018-04-19 RX ORDER — ATORVASTATIN CALCIUM 40 MG/1
TABLET, FILM COATED ORAL
Qty: 30 TAB | Refills: 11 | Status: SHIPPED | OUTPATIENT
Start: 2018-04-19 | End: 2018-11-08 | Stop reason: SDUPTHER

## 2018-11-08 ENCOUNTER — OFFICE VISIT (OUTPATIENT)
Dept: CARDIOLOGY CLINIC | Age: 73
End: 2018-11-08

## 2018-11-08 VITALS
HEART RATE: 99 BPM | WEIGHT: 136 LBS | OXYGEN SATURATION: 96 % | SYSTOLIC BLOOD PRESSURE: 141 MMHG | DIASTOLIC BLOOD PRESSURE: 91 MMHG | BODY MASS INDEX: 25.7 KG/M2

## 2018-11-08 DIAGNOSIS — I10 ESSENTIAL HYPERTENSION: ICD-10-CM

## 2018-11-08 RX ORDER — ATORVASTATIN CALCIUM 40 MG/1
TABLET, FILM COATED ORAL
Qty: 30 TAB | Refills: 11 | Status: SHIPPED | OUTPATIENT
Start: 2018-11-08 | End: 2019-12-12 | Stop reason: SDUPTHER

## 2018-11-08 RX ORDER — CARVEDILOL 3.12 MG/1
TABLET ORAL
Qty: 60 TAB | Refills: 11 | Status: SHIPPED | OUTPATIENT
Start: 2018-11-08 | End: 2019-12-01 | Stop reason: SDUPTHER

## 2018-11-08 NOTE — PROGRESS NOTES
Vikki Reeder is a 75 y.o. female with a history of coronary artery disease, status post inferior ST elevation MI and RCA drug-eluting stent in October 2011. She also has diabetes, hyperlipidemia and hypertension. Ms. Daryle Sober is here today for follow up appointment. She denies any symptoms to suggest angina or heart failure. She said she has finished 15 radiation treatments for her breast cancer. She only took one chemotherapy session. Overall she has no new symptoms to report from a cardiovascular standpoint. She denies any use of sublingual nitroglycerin. She denies palpitations, presyncope or syncope. Able to climb 2/3 flights without any symptoms. MH: 
Past Medical History:  
Diagnosis Date  Borderline diabetes Diet controlled  CAD (coronary artery disease) 10/2011 S/P RCA GOLDIE (3.0 X 18 mm Xience) 10/2011  HLD (hyperlipidemia)  HTN (hypertension)  Invasive ductal carcinoma of breast ([facility] [address] 02/2018 Left breast, Radiation  STEMI (ST elevation myocardial infarction) ([facility] [address] Inferior STEMI (10/2011) PSH: 
Past Surgical History:  
Procedure Laterality Date  CARDIAC CATHETERIZATION  10/27/2011 L heart cath;LAD 10%-20% luminal irreg; 99%stenosis of RCA using 3.0 x18-mm Xience MEDS: 
Current Outpatient Medications Medication Sig Dispense Refill  atorvastatin (LIPITOR) 40 mg tablet take 1 tablet by mouth once daily 30 Tab 11  
 amLODIPine (NORVASC) 10 mg tablet  metFORMIN (GLUCOPHAGE) 500 mg tablet   0  
 carvedilol (COREG) 3.125 mg tablet take 1 tablet by mouth twice a day with meals 60 Tab 10  
 nitroglycerin (NITROSTAT) 0.4 mg SL tablet 1 Tab by SubLINGual route every five (5) minutes as needed for Chest Pain. 1 Bottle 1  
 aspirin delayed-release 81 mg tablet Take  by mouth daily. Allergies and Sensitivities: 
Allergies Allergen Reactions  Pcn [Penicillins] Unknown (comments) Family History: 
Family History Problem Relation Age of Onset  Heart Attack Mother  Heart Attack Father Social History: 
Social History Tobacco Use  Smoking status: Former Smoker Packs/day: 0.50 Years: 30.00 Pack years: 15.00 Last attempt to quit: 10/16/2011 Years since quittin.0  Smokeless tobacco: Never Used Substance Use Topics  Alcohol use: No  
 Drug use: No  
 
Review of Systems: As above in HPI, otherwise 10 point review negative. Physical Exam: 
BP Readings from Last 3 Encounters:  
18 (!) 141/91  
02/15/18 148/82  
17 (!) 160/98 Pulse Readings from Last 3 Encounters:  
18 99  
02/15/18 97  
17 82 Wt Readings from Last 3 Encounters:  
18 136 lb (61.7 kg) 02/15/18 138 lb (62.6 kg) 17 139 lb (63 kg) Constitutional: Oriented to person, place, and time. HENT: Head: Normocephalic and atraumatic. Neck: No JVD present. Cardiovascular: Regular rhythm. No murmur, gallop or rubs appriciated Lung[de-identified] Breath sounds normal. No respiratory distress. No ronchi or rales appriciated Abdominal: No tenderness. No rebound and no guarding. Musculoskeletal: There is no edema. No cynosis Review of Data: 
EKG:  
Sinus rhythm at the rate of 75 beats per minute. There was Q wave in lead III. No dynamic ST-T changes of ischemia. LABS:  
Lab Results Component Value Date/Time Sodium 143 2012 12:00 AM  
 Potassium 4.6 2012 12:00 AM  
 Chloride 107 2012 12:00 AM  
 CO2 26 2012 12:00 AM  
 Glucose 108 (H) 2012 12:00 AM  
 BUN 11 2012 12:00 AM  
 Creatinine 0.57 2012 12:00 AM  
 
 
Lab Results Component Value Date/Time Cholesterol, total 183 10/28/2011 01:20 AM  
 HDL Cholesterol 36 (L) 10/28/2011 01:20 AM  
 LDL, calculated 121.4 (H) 10/28/2011 01:20 AM  
 Triglyceride 128 10/28/2011 01:20 AM  
 CHOL/HDL Ratio 5.1 (H) 10/28/2011 01:20 AM  
 
 
No results found for: GPT Lab Results Component Value Date/Time Hemoglobin A1c 6.4 (H) 10/28/2011 01:20 AM  
 
FLP (12/2011) at PCP 
/ TG 94, LDL 62, HDL 42 ECHO:(10/2011) Findings suggest single vessel coronary artery disease in the territory of  the right coronary artery. There was no significant valvular heart disease. SUMMARY:  Left ventricle: Size was normal. Systolic function was normal. Ejection  fraction was estimated in the range of 60 % to 65 %. There was hypokinesis  of the basal inferoseptal and basal inferior wall(s). Wall thickness was  mildly increased. Right ventricle: Systolic pressure was at the upper limits of normal.   
Estimated peak pressure was in the range of 25 mmHg to 30 mmHg. Left atrium: The atrium was mildly dilated. Pericardium: No significant pericardial effusion identified. Features were  not consistent with tamponade physiology. CATHETERIZATION:(10/2011) 1. Left Main: Long, but angiographically normal.   
2. LAD: 10% to 20% luminal irregularities into the proximal mid portion. 3. LCX: Very tortuous vessel, but no significant stenosis. 4. RCA: A proximal subtotal 99% occlusion with angiographic concern of  dissection. Successful percutaneous coronary intervention and stenting of  the proximal 99% RCA stenosis using 3.0 x 18-mm Xience drug eluting stent,  followed by a 3.25 x 15 mm noncompliant post dilation. Repeat post dilation with a 4.0 x 12-mm compliant balloon at 14 atmospheres. No angiographic  stenosis or dissection post procedure. Impression / Plan: Ms. Boy Felder is a pleasant, 75 y.o. female with history of coronary artery disease, hypertension, hyperlipidemia, and borderline diabetes. Coronary artery disease:  She has an inferior STEMI in October of 2011 requiring RCA drug-eluting stent. No angina. Continue same. She is on ASA, amlodipine statin and BB. No use of S/L Since last time. Stable Hypertension:  Blood pressure is stable. BP today 141/90 mm hg.  On coreg and amlodipine. Continue same. H/O hyperkalemia from lisinopril in past. Not on ACE-I since then. Hyperlipidemia:  Defer to primary care Continue same for now. She is on atorvastatin 40 mg daily. No side effect of medication. Being checked by PCP regularly L Breast cancer: 
Diagnosed with Invasive ductal ca of left breast in 01/18, MRI and biopsy proven S/P surgery, radiation. Defer to Hem/Onc team 
 
This plan was discussed with Ms. Lesley Eisenmenger in detail, who is in agreement. Thank you Dr. Rozina Calderon for allowing me to participate in the patient's care. Feel free to call me with any questions or concerns.

## 2018-11-08 NOTE — PROGRESS NOTES
1. Have you been to the ER, urgent care clinic since your last visit? Hospitalized since your last visit? No  
 
2. Have you seen or consulted any other health care providers outside of the [address] [place] [address] since your last visit? Include any pap smears or colon screening.  No

## 2019-11-08 ENCOUNTER — OFFICE VISIT (OUTPATIENT)
Dept: CARDIOLOGY CLINIC | Age: 74
End: 2019-11-08

## 2019-11-08 VITALS
BODY MASS INDEX: 26.07 KG/M2 | DIASTOLIC BLOOD PRESSURE: 92 MMHG | WEIGHT: 138 LBS | HEART RATE: 82 BPM | SYSTOLIC BLOOD PRESSURE: 154 MMHG | OXYGEN SATURATION: 98 %

## 2019-11-08 DIAGNOSIS — I10 ESSENTIAL HYPERTENSION: Primary | ICD-10-CM

## 2019-11-08 DIAGNOSIS — E78.00 PURE HYPERCHOLESTEROLEMIA: ICD-10-CM

## 2019-11-08 DIAGNOSIS — I25.83 CORONARY ARTERY DISEASE DUE TO LIPID RICH PLAQUE: ICD-10-CM

## 2019-11-08 DIAGNOSIS — I25.10 CORONARY ARTERY DISEASE DUE TO LIPID RICH PLAQUE: ICD-10-CM

## 2019-11-08 RX ORDER — TICAGRELOR 90 MG/1
TABLET ORAL
COMMUNITY
Start: 2019-11-01 | End: 2019-11-08 | Stop reason: ALTCHOICE

## 2019-11-08 RX ORDER — CLOPIDOGREL BISULFATE 75 MG/1
75 TABLET ORAL DAILY
Qty: 90 TAB | Refills: 3 | Status: SHIPPED | OUTPATIENT
Start: 2019-11-08 | End: 2020-02-24 | Stop reason: SDUPTHER

## 2019-11-08 NOTE — PROGRESS NOTES
Franklin Ulloa is a 75 y.o. female with a history of coronary artery disease, status post inferior ST elevation MI and RCA drug-eluting stent in October 2011. She also has diabetes, hyperlipidemia and hypertension. Ms. Cora Farias is here today for follow up appointment. 2 weeks ago, patient started having chest pain and was taken to the [facility].  She underwent coronary angiogram and RCA stent placement. No chest pain or tightness since then. Occasional dyspnea with exertion since MI. She denies palpitation, presyncope or syncope. She is on Brilinta and unfortunately she cannot afford that medication. She would like to change it to something else. She denies PND or lower extremity swelling  Able to climb 2/3 flights without any symptoms. MH:  Past Medical History:   Diagnosis Date    Borderline diabetes     Diet controlled    CAD (coronary artery disease) 10/2011    S/P RCA (3/0 X 20 mm SYNERGY GOLDIE) 2019, RCA GOLDIE (3.0 X 18 mm Xience) 10/2011    HLD (hyperlipidemia)     HTN (hypertension)     Invasive ductal carcinoma of breast ([facility] [address] 02/2018    Left breast, Radiation    STEMI (ST elevation myocardial infarction) ([facility] [address]     Inferior STEMI (10/2011)     PSH:  Past Surgical History:   Procedure Laterality Date    CARDIAC CATHETERIZATION  10/27/2011    L heart cath;LAD 10%-20% luminal irreg; 99%stenosis of RCA using 3.0 x18-mm Xience     MEDS:  Current Outpatient Medications   Medication Sig Dispense Refill    clopidogrel (PLAVIX) 75 mg tab Take 1 Tab by mouth daily. 90 Tab 3    atorvastatin (LIPITOR) 40 mg tablet take 1 tablet by mouth once daily 30 Tab 11    carvedilol (COREG) 3.125 mg tablet take 1 tablet by mouth twice a day with meals 60 Tab 11    amLODIPine (NORVASC) 10 mg tablet       nitroglycerin (NITROSTAT) 0.4 mg SL tablet 1 Tab by SubLINGual route every five (5) minutes as needed for Chest Pain. 1 Bottle 1    aspirin delayed-release 81 mg tablet Take  by mouth daily. Allergies and Sensitivities:  Allergies   Allergen Reactions    Pcn [Penicillins] Unknown (comments)     Family History:  Family History   Problem Relation Age of Onset    Heart Attack Mother     Heart Attack Father      Social History:  Social History     Tobacco Use    Smoking status: Former Smoker     Packs/day: 0.50     Years: 30.00     Pack years: 15.00     Last attempt to quit: 10/16/2011     Years since quittin.0    Smokeless tobacco: Never Used   Substance Use Topics    Alcohol use: No    Drug use: No     Review of Systems:  As above in HPI, otherwise 10 point review negative. Physical Exam:  BP Readings from Last 3 Encounters:   19 (!) 154/92   18 (!) 141/91   02/15/18 148/82       Pulse Readings from Last 3 Encounters:   19 82   18 99   02/15/18 97       Wt Readings from Last 3 Encounters:   19 138 lb (62.6 kg)   18 136 lb (61.7 kg)   02/15/18 138 lb (62.6 kg)     Constitutional: Oriented to person, place, and time. HENT: Head: Normocephalic and atraumatic. Neck: No JVD present. Cardiovascular: Regular rhythm. No murmur, gallop or rubs appriciated  Lung[de-identified] Breath sounds normal. No respiratory distress. No ronchi or rales appriciated  Abdominal: No tenderness. No rebound and no guarding. Musculoskeletal: There is no edema. No cynosis    Review of Data:  EKG:   Sinus rhythm at the rate of 75 beats per minute. There was Q wave in lead III. No dynamic ST-T changes of ischemia.     LABS:   Lab Results   Component Value Date/Time    Sodium 143 2012 12:00 AM    Potassium 4.6 2012 12:00 AM    Chloride 107 2012 12:00 AM    CO2 26 2012 12:00 AM    Glucose 108 (H) 2012 12:00 AM    BUN 11 2012 12:00 AM    Creatinine 0.57 2012 12:00 AM       Lab Results   Component Value Date/Time    Cholesterol, total 183 10/28/2011 01:20 AM    HDL Cholesterol 36 (L) 10/28/2011 01:20 AM    LDL, calculated 121.4 (H) 10/28/2011 01:20 AM    Triglyceride 128 10/28/2011 01:20 AM    CHOL/HDL Ratio 5.1 (H) 10/28/2011 01:20 AM       No results found for: GPT    Lab Results   Component Value Date/Time    Hemoglobin A1c 6.4 (H) 10/28/2011 01:20 AM     FLP (12/2011) at [facility]  / TG 94, LDL 62, HDL 42    ECHO:(10/2011)  Findings suggest single vessel coronary artery disease in the territory of  the right coronary artery. There was no significant valvular heart disease. SUMMARY:  Left ventricle: Size was normal. Systolic function was normal. Ejection  fraction was estimated in the range of 60 % to 65 %. There was hypokinesis  of the basal inferoseptal and basal inferior wall(s). Wall thickness was  mildly increased. ECHO (10/19)  LOW NORMAL LEFT VENTRICULAR EJECTION FRACTION 49%. AKINESIS OF THE MID TO BASAL INFERIOR WALL AND BASAL INFEROSEPTAL WALL. HYPOKINESIS OF THE BASAL INFEROLATERAL WALL SEGMENT. ABNORMAL DIASTOLIC FILLING PATTERN FOR AGE. NORMAL RIGHT VENTRICULAR SIZE. NORMAL RIGHT VENTRICULAR GLOBAL SYSTOLIC FUNCTION. ESTIMATED PULMONARY ARTERY PRESSURE IS  45 MMHG. NO HEMODYNAMICALLY SIGNIFICANT VALVULAR PATHOLOGY. NO PREVIOUS REPORT FOR COMPARISON. CATHETERIZATION:(10/2011)  1. Left Main: Long, but angiographically normal.    2. LAD: 10% to 20% luminal irregularities into the proximal mid portion. 3. LCX: Very tortuous vessel, but no significant stenosis. 4. RCA: A proximal subtotal 99% occlusion with angiographic concern of  dissection. Successful percutaneous coronary intervention and stenting of  the proximal 99% RCA stenosis using 3.0 x 18-mm Xience drug eluting stent,  followed by a 3.25 x 15 mm noncompliant post dilation. Repeat post dilation with a 4.0 x 12-mm compliant balloon at 14 atmospheres. No angiographic  stenosis or dissection post procedure.       CATH (10/19)  - Left main coronary artery: mild irregularities, moderate focal disease in mid segment (40-50%)  - Left anterior descending coronary artery: mild diffuse disease  - Left circumflex coronary artery: small, at the junction of a posterolateral branch there is a beak that may represent an occlusion of an AV LCx.  There is an rPL that covers the lateral wall at least partially.    - Ramus intermediate artery: not present  - Right coronary artery: dominant, severe disease in prox/mid RCA. ANISHA/culprit     Successful Primary PCI of the proximal and mid RCA with a 3.0 x 20 mm Synergy EES (post-dilated with a 3.25 mm NC balloon)    Impression / Plan:  Ms. Roosevelt Rasheed is a pleasant, 75 y.o. female with history of coronary artery disease, hypertension, hyperlipidemia, and borderline diabetes. Coronary artery disease:   She has an inferior STEMI in October of 2011 requiring RCA drug-eluting stent. She had ACS in October 2019, requiring another RCA stent. No angina. Cannot afford Brilinta. Will discontinue Brilinta once current supply is finished and then start Plavix 75 mg daily. She understand not to take both medication at same time. Aspirin 81 mg will be continued lifelong. Continue with beta-blocker. Hypertension:  Blood pressure is stable. BP today 150/79 repeat blood pressure was 142/80. Continue with Coreg and amlodipine  H/O hyperkalemia from lisinopril in past. Not on ACE-I since then. Hyperlipidemia:  Defer to primary care Continue same for now. She is on atorvastatin 40 mg daily. No side effect of medication. Being checked by PCP regularly    L Breast cancer:  Diagnosed with Invasive ductal ca of left breast in 01/18, MRI and biopsy proven  S/P surgery, radiation. Defer to Hem/Onc team    This plan was discussed with Ms. Roosevelt Rasheed in detail, who is in agreement. Thank you Dr. Jose Hyde for allowing me to participate in the patient's care. Feel free to call me with any questions or concerns.

## 2019-12-01 DIAGNOSIS — I10 ESSENTIAL HYPERTENSION: ICD-10-CM

## 2019-12-02 RX ORDER — CARVEDILOL 3.12 MG/1
TABLET ORAL
Qty: 60 TAB | Refills: 11 | Status: SHIPPED | OUTPATIENT
Start: 2019-12-02 | End: 2020-02-24 | Stop reason: SDUPTHER

## 2019-12-13 RX ORDER — ATORVASTATIN CALCIUM 40 MG/1
TABLET, FILM COATED ORAL
Qty: 30 TAB | Refills: 0 | Status: SHIPPED | OUTPATIENT
Start: 2019-12-13 | End: 2020-01-02 | Stop reason: SDUPTHER

## 2020-01-03 RX ORDER — ATORVASTATIN CALCIUM 40 MG/1
40 TABLET, FILM COATED ORAL DAILY
Qty: 30 TAB | Refills: 6 | Status: SHIPPED | OUTPATIENT
Start: 2020-01-03 | End: 2020-02-24 | Stop reason: SDUPTHER

## 2020-02-24 ENCOUNTER — OFFICE VISIT (OUTPATIENT)
Dept: CARDIOLOGY CLINIC | Age: 75
End: 2020-02-24

## 2020-02-24 VITALS
DIASTOLIC BLOOD PRESSURE: 72 MMHG | BODY MASS INDEX: 23.62 KG/M2 | HEART RATE: 77 BPM | WEIGHT: 125 LBS | SYSTOLIC BLOOD PRESSURE: 123 MMHG | OXYGEN SATURATION: 97 %

## 2020-02-24 DIAGNOSIS — I10 ESSENTIAL HYPERTENSION: ICD-10-CM

## 2020-02-24 RX ORDER — CARVEDILOL 3.12 MG/1
TABLET ORAL
Qty: 60 TAB | Refills: 11 | Status: SHIPPED | OUTPATIENT
Start: 2020-02-24 | End: 2020-11-09 | Stop reason: SDUPTHER

## 2020-02-24 RX ORDER — ATORVASTATIN CALCIUM 40 MG/1
40 TABLET, FILM COATED ORAL DAILY
Qty: 30 TAB | Refills: 6 | Status: SHIPPED | OUTPATIENT
Start: 2020-02-24 | End: 2020-11-09 | Stop reason: SDUPTHER

## 2020-02-24 RX ORDER — CLOPIDOGREL BISULFATE 75 MG/1
75 TABLET ORAL DAILY
Qty: 90 TAB | Refills: 3 | Status: SHIPPED | OUTPATIENT
Start: 2020-02-24 | End: 2020-11-09 | Stop reason: SDUPTHER

## 2020-02-24 NOTE — PROGRESS NOTES
Chadd Casillas is a 67 y.o. female with a history of coronary artery disease, status post inferior ST elevation MI and RCA drug-eluting stent in October 2011. She also has diabetes, hyperlipidemia and hypertension. Ms. Montanez Parent is here today for follow up appointment. She denies any hospital admission or ER visit since last time. She denies any chest pain or chest tightness. She denies any use of nitroglycerin. Overall she has no symptoms to report    MH:  Past Medical History:   Diagnosis Date    Borderline diabetes     Diet controlled    CAD (coronary artery disease) 10/2011    S/P RCA (3/0 X 20 mm SYNERGY GOLDIE) 2019, RCA GOLDIE (3.0 X 18 mm Xience) 10/2011    HLD (hyperlipidemia)     HTN (hypertension)     Invasive ductal carcinoma of breast ([facility] [address] 02/2018    Left breast, Radiation    STEMI (ST elevation myocardial infarction) ([facility] [address]     Inferior STEMI (10/2011)     PSH:  Past Surgical History:   Procedure Laterality Date    CARDIAC CATHETERIZATION  10/27/2011    L heart cath;LAD 10%-20% luminal irreg; 99%stenosis of RCA using 3.0 x18-mm Xience     MEDS:  Current Outpatient Medications   Medication Sig Dispense Refill    atorvastatin (LIPITOR) 40 mg tablet Take 1 Tab by mouth daily. 30 Tab 6    carvedilol (COREG) 3.125 mg tablet TAKE 1 TABLET BY MOUTH TWICE DAILY WITH MEALS 60 Tab 11    clopidogrel (PLAVIX) 75 mg tab Take 1 Tab by mouth daily. 90 Tab 3    amLODIPine (NORVASC) 10 mg tablet       nitroglycerin (NITROSTAT) 0.4 mg SL tablet 1 Tab by SubLINGual route every five (5) minutes as needed for Chest Pain. 1 Bottle 1    aspirin delayed-release 81 mg tablet Take  by mouth daily.          Allergies and Sensitivities:  Allergies   Allergen Reactions    Pcn [Penicillins] Unknown (comments)     Family History:  Family History   Problem Relation Age of Onset    Heart Attack Mother     Heart Attack Father      Social History:  Social History     Tobacco Use    Smoking status: Former Smoker Packs/day: 0.50     Years: 30.00     Pack years: 15.00     Last attempt to quit: 10/16/2011     Years since quittin.3    Smokeless tobacco: Never Used   Substance Use Topics    Alcohol use: No    Drug use: No     Review of Systems:  As above in HPI, otherwise 10 point review negative. Physical Exam:  BP Readings from Last 3 Encounters:   20 123/72   19 (!) 154/92   18 (!) 141/91       Pulse Readings from Last 3 Encounters:   20 77   19 82   18 99       Wt Readings from Last 3 Encounters:   20 125 lb (56.7 kg)   19 138 lb (62.6 kg)   18 136 lb (61.7 kg)     Constitutional: Oriented to person, place, and time. HENT: Head: Normocephalic and atraumatic. Neck: No JVD present. Cardiovascular: Regular rhythm. No murmur, gallop or rubs appriciated  Lung[de-identified] Breath sounds normal. No respiratory distress. No ronchi or rales appriciated  Abdominal: No tenderness. No rebound and no guarding. Musculoskeletal: There is no edema. No cynosis    Review of Data:  EKG:   Sinus rhythm at the rate of 75 beats per minute. There was Q wave in lead III. No dynamic ST-T changes of ischemia.     LABS:   Lab Results   Component Value Date/Time    Sodium 143 2012 12:00 AM    Potassium 4.6 2012 12:00 AM    Chloride 107 2012 12:00 AM    CO2 26 2012 12:00 AM    Glucose 108 (H) 2012 12:00 AM    BUN 11 2012 12:00 AM    Creatinine 0.57 2012 12:00 AM       Lab Results   Component Value Date/Time    Cholesterol, total 183 10/28/2011 01:20 AM    HDL Cholesterol 36 (L) 10/28/2011 01:20 AM    LDL, calculated 121.4 (H) 10/28/2011 01:20 AM    Triglyceride 128 10/28/2011 01:20 AM    CHOL/HDL Ratio 5.1 (H) 10/28/2011 01:20 AM       No results found for: GPT    Lab Results   Component Value Date/Time    Hemoglobin A1c 6.4 (H) 10/28/2011 01:20 AM     FLP (2011) at PCP  / TG 94, LDL 62, HDL 42    ECHO:(10/2011)  Findings suggest single vessel coronary artery disease in the territory of  the right coronary artery. There was no significant valvular heart disease. SUMMARY:  Left ventricle: Size was normal. Systolic function was normal. Ejection  fraction was estimated in the range of 60 % to 65 %. There was hypokinesis  of the basal inferoseptal and basal inferior wall(s). Wall thickness was  mildly increased. ECHO (10/19)  LOW NORMAL LEFT VENTRICULAR EJECTION FRACTION 49%. AKINESIS OF THE MID TO BASAL INFERIOR WALL AND BASAL INFEROSEPTAL WALL. HYPOKINESIS OF THE BASAL INFEROLATERAL WALL SEGMENT. ABNORMAL DIASTOLIC FILLING PATTERN FOR AGE. NORMAL RIGHT VENTRICULAR SIZE. NORMAL RIGHT VENTRICULAR GLOBAL SYSTOLIC FUNCTION. ESTIMATED PULMONARY ARTERY PRESSURE IS  45 MMHG. NO HEMODYNAMICALLY SIGNIFICANT VALVULAR PATHOLOGY. NO PREVIOUS REPORT FOR COMPARISON. CATHETERIZATION:(10/2011)  1. Left Main: Long, but angiographically normal.    2. LAD: 10% to 20% luminal irregularities into the proximal mid portion. 3. LCX: Very tortuous vessel, but no significant stenosis. 4. RCA: A proximal subtotal 99% occlusion with angiographic concern of  dissection. Successful percutaneous coronary intervention and stenting of  the proximal 99% RCA stenosis using 3.0 x 18-mm Xience drug eluting stent,  followed by a 3.25 x 15 mm noncompliant post dilation. Repeat post dilation with a 4.0 x 12-mm compliant balloon at 14 atmospheres. No angiographic  stenosis or dissection post procedure.       CATH (10/19)  - Left main coronary artery: mild irregularities, moderate focal disease in mid segment (40-50%)  - Left anterior descending coronary artery: mild diffuse disease  - Left circumflex coronary artery: small, at the junction of a posterolateral branch there is a beak that may represent an occlusion of an AV LCx.  There is an rPL that covers the lateral wall at least partially.    - Ramus intermediate artery: not present  - Right coronary artery: dominant, severe disease in prox/mid RCA. ANISHA/culprit     Successful Primary PCI of the proximal and mid RCA with a 3.0 x 20 mm Synergy EES (post-dilated with a 3.25 mm NC balloon)    Impression / Plan:  Ms. Misa Okeefe is a pleasant, 67 y.o. female with history of coronary artery disease, hypertension, hyperlipidemia, and borderline diabetes. Coronary artery disease:   She has an inferior STEMI in October of 2011 requiring RCA drug-eluting stent. She had ACS in October 2019, requiring another RCA stent. No angina or use of nitroglycerin since last time  Continue dual antiplatelet agent. Continue Coreg and statin    Hypertension:  Blood pressure is stable. BP today 123/73   H/O hyperkalemia from lisinopril in past. Not on ACE-I since then. Hyperlipidemia:  Defer to primary care Continue same for now. She is on atorvastatin 40 mg daily. No side effect of medication. Being checked by PCP regularly    L Breast cancer:  Diagnosed with Invasive ductal ca of left breast in 01/18, MRI and biopsy proven  S/P surgery, radiation. Defer to Hem/Onc team    This plan was discussed with Ms. Misa Okeefe in detail, who is in agreement. Thank you Dr. Ethel Cardozo for allowing me to participate in the patient's care. Feel free to call me with any questions or concerns.

## 2020-02-24 NOTE — PROGRESS NOTES
1. Have you been to the ER, urgent care clinic since your last visit? Hospitalized since your last visit? yes    2. Have you seen or consulted any other health care providers outside of the [address] [place] [address] since your last visit? Include any pap smears or colon screening.    yes

## 2020-09-01 ENCOUNTER — OFFICE VISIT (OUTPATIENT)
Dept: CARDIOLOGY CLINIC | Age: 75
End: 2020-09-01

## 2020-11-09 ENCOUNTER — OFFICE VISIT (OUTPATIENT)
Dept: CARDIOLOGY CLINIC | Age: 75
End: 2020-11-09
Payer: MEDICARE

## 2020-11-09 VITALS
SYSTOLIC BLOOD PRESSURE: 143 MMHG | BODY MASS INDEX: 26.88 KG/M2 | HEART RATE: 78 BPM | OXYGEN SATURATION: 96 % | TEMPERATURE: 97.5 F | RESPIRATION RATE: 16 BRPM | HEIGHT: 61 IN | WEIGHT: 142.4 LBS | DIASTOLIC BLOOD PRESSURE: 87 MMHG

## 2020-11-09 DIAGNOSIS — I10 ESSENTIAL HYPERTENSION: Primary | ICD-10-CM

## 2020-11-09 PROCEDURE — 93000 ELECTROCARDIOGRAM COMPLETE: CPT | Performed by: INTERNAL MEDICINE

## 2020-11-09 PROCEDURE — 99214 OFFICE O/P EST MOD 30 MIN: CPT | Performed by: INTERNAL MEDICINE

## 2020-11-09 RX ORDER — ATORVASTATIN CALCIUM 40 MG/1
40 TABLET, FILM COATED ORAL DAILY
Qty: 90 TAB | Refills: 3 | Status: SHIPPED | OUTPATIENT
Start: 2020-11-09

## 2020-11-09 RX ORDER — CLOPIDOGREL BISULFATE 75 MG/1
75 TABLET ORAL DAILY
Qty: 90 TAB | Refills: 3 | Status: SHIPPED | OUTPATIENT
Start: 2020-11-09 | End: 2021-12-20

## 2020-11-09 RX ORDER — AMLODIPINE BESYLATE 10 MG/1
10 TABLET ORAL DAILY
Qty: 90 TAB | Refills: 3 | Status: SHIPPED | OUTPATIENT
Start: 2020-11-09 | End: 2021-10-18

## 2020-11-09 RX ORDER — CARVEDILOL 3.12 MG/1
TABLET ORAL
Qty: 180 TAB | Refills: 3 | Status: SHIPPED | OUTPATIENT
Start: 2020-11-09

## 2020-11-09 RX ORDER — ASPIRIN 81 MG/1
81 TABLET ORAL DAILY
Qty: 90 TAB | Refills: 3 | Status: SHIPPED | OUTPATIENT
Start: 2020-11-09

## 2020-11-09 NOTE — PROGRESS NOTES
Irasema Lopez is a 67 y.o. female with a history of coronary artery disease, status post inferior ST elevation MI and RCA drug-eluting stent in October 2011. She also has diabetes, hyperlipidemia and hypertension. Ms. Misa Trejo is here today for follow up appointment. She denies any hospital admission or ER visit since last time. She denies any chest pain or chest tightness. She denies any use of nitroglycerin. Overall she has no symptoms to report  She was told to have EKG performed in our clinic today by oncology team for follow-up. MH:  Past Medical History:   Diagnosis Date    Borderline diabetes     Diet controlled    CAD (coronary artery disease) 10/2011    S/P RCA (3/0 X 20 mm SYNERGY GOLDIE) 2019, RCA GOLDIE (3.0 X 18 mm Xience) 10/2011    HLD (hyperlipidemia)     HTN (hypertension)     Invasive ductal carcinoma of breast ([facility] [address] 02/2018    Left breast, Radiation    STEMI (ST elevation myocardial infarction) ([facility] [address]     Inferior STEMI (10/2011)     PSH:  Past Surgical History:   Procedure Laterality Date    CARDIAC CATHETERIZATION  10/27/2011    L heart cath;LAD 10%-20% luminal irreg; 99%stenosis of RCA using 3.0 x18-mm Xience     MEDS:  Current Outpatient Medications   Medication Sig Dispense Refill    atorvastatin (LIPITOR) 40 mg tablet Take 1 Tab by mouth daily. 30 Tab 6    clopidogreL (PLAVIX) 75 mg tab Take 1 Tab by mouth daily. 90 Tab 3    carvediloL (COREG) 3.125 mg tablet TAKE 1 TABLET BY MOUTH TWICE DAILY WITH MEALS 60 Tab 11    amLODIPine (NORVASC) 10 mg tablet       nitroglycerin (NITROSTAT) 0.4 mg SL tablet 1 Tab by SubLINGual route every five (5) minutes as needed for Chest Pain. 1 Bottle 1    aspirin delayed-release 81 mg tablet Take  by mouth daily.          Allergies and Sensitivities:  Allergies   Allergen Reactions    Pcn [Penicillins] Unknown (comments)     Family History:  Family History   Problem Relation Age of Onset    Heart Attack Mother     Heart Attack Father Social History:  Social History     Tobacco Use    Smoking status: Former Smoker     Packs/day: 0.50     Years: 30.00     Pack years: 15.00     Last attempt to quit: 10/16/2011     Years since quittin.0    Smokeless tobacco: Never Used   Substance Use Topics    Alcohol use: No    Drug use: No     Review of Systems:  As above in HPI, otherwise 10 point review negative. Physical Exam:  BP Readings from Last 3 Encounters:   20 123/72   19 (!) 154/92   18 (!) 141/91       Pulse Readings from Last 3 Encounters:   20 77   19 82   18 99       Wt Readings from Last 3 Encounters:   20 125 lb (56.7 kg)   19 138 lb (62.6 kg)   18 136 lb (61.7 kg)     Constitutional: Oriented to person, place, and time. HENT: Head: Normocephalic and atraumatic. Neck: No JVD present. Cardiovascular: Regular rhythm. No murmur, gallop or rubs appriciated  Lung[de-identified] Breath sounds normal. No respiratory distress. No ronchi or rales appriciated  Abdominal: No tenderness. No rebound and no guarding. Musculoskeletal: There is no edema. No cynosis    Review of Data:  EKG:   Sinus rhythm at the rate of 75 beats per minute. There was Q wave in lead III. No dynamic ST-T changes of ischemia.     LABS:   Lab Results   Component Value Date/Time    Sodium 143 2012 12:00 AM    Potassium 4.6 2012 12:00 AM    Chloride 107 2012 12:00 AM    CO2 26 2012 12:00 AM    Glucose 108 (H) 2012 12:00 AM    BUN 11 2012 12:00 AM    Creatinine 0.57 2012 12:00 AM       Lab Results   Component Value Date/Time    Cholesterol, total 183 10/28/2011 01:20 AM    HDL Cholesterol 36 (L) 10/28/2011 01:20 AM    LDL, calculated 121.4 (H) 10/28/2011 01:20 AM    Triglyceride 128 10/28/2011 01:20 AM    CHOL/HDL Ratio 5.1 (H) 10/28/2011 01:20 AM       No components found for: GPT    Lab Results   Component Value Date/Time    Hemoglobin A1c 6.4 (H) 10/28/2011 01:20 AM     FLP (12/2011) at [facility]  / TG 94, LDL 62, HDL 42    ECHO:(10/2011)  Findings suggest single vessel coronary artery disease in the territory of  the right coronary artery. There was no significant valvular heart disease. SUMMARY:  Left ventricle: Size was normal. Systolic function was normal. Ejection  fraction was estimated in the range of 60 % to 65 %. There was hypokinesis  of the basal inferoseptal and basal inferior wall(s). Wall thickness was  mildly increased. ECHO (10/19)  LOW NORMAL LEFT VENTRICULAR EJECTION FRACTION 49%. AKINESIS OF THE MID TO BASAL INFERIOR WALL AND BASAL INFEROSEPTAL WALL. HYPOKINESIS OF THE BASAL INFEROLATERAL WALL SEGMENT. ABNORMAL DIASTOLIC FILLING PATTERN FOR AGE. NORMAL RIGHT VENTRICULAR SIZE. NORMAL RIGHT VENTRICULAR GLOBAL SYSTOLIC FUNCTION. ESTIMATED PULMONARY ARTERY PRESSURE IS  45 MMHG. NO HEMODYNAMICALLY SIGNIFICANT VALVULAR PATHOLOGY. NO PREVIOUS REPORT FOR COMPARISON. CATHETERIZATION:(10/2011)  1. Left Main: Long, but angiographically normal.    2. LAD: 10% to 20% luminal irregularities into the proximal mid portion. 3. LCX: Very tortuous vessel, but no significant stenosis. 4. RCA: A proximal subtotal 99% occlusion with angiographic concern of  dissection. Successful percutaneous coronary intervention and stenting of  the proximal 99% RCA stenosis using 3.0 x 18-mm Xience drug eluting stent,  followed by a 3.25 x 15 mm noncompliant post dilation. Repeat post dilation with a 4.0 x 12-mm compliant balloon at 14 atmospheres. No angiographic  stenosis or dissection post procedure.       CATH (10/19)  - Left main coronary artery: mild irregularities, moderate focal disease in mid segment (40-50%)  - Left anterior descending coronary artery: mild diffuse disease  - Left circumflex coronary artery: small, at the junction of a posterolateral branch there is a beak that may represent an occlusion of an AV LCx.  There is an rPL that covers the lateral wall at least partially.    - Ramus intermediate artery: not present  - Right coronary artery: dominant, severe disease in prox/mid RCA. ANISHA/culprit     Successful Primary PCI of the proximal and mid RCA with a 3.0 x 20 mm Synergy EES (post-dilated with a 3.25 mm NC balloon)    Impression / Plan:  Ms. Nestor Santos is a pleasant, 67 y.o. female with history of coronary artery disease, hypertension, hyperlipidemia, and borderline diabetes. Coronary artery disease:   She has an inferior STEMI in October of 2011 requiring RCA drug-eluting stent. She had ACS in October 2019, requiring another RCA stent. No angina or use of nitroglycerin since last time  Continue dual antiplatelet agent. Calculated DAPT score and then discussed risk benefit and alternatives of dual antiplatelet agent with the patient. She would like to continue dual antiplatelet   continue Coreg and statin    Hypertension:  Blood pressure is stable. H/O hyperkalemia from lisinopril in past. Not on ACE-I since then. Hyperlipidemia:  Defer to primary care Continue same for now. She is on atorvastatin 40 mg daily. No side effect of medication. Being checked by PCP regularly    L Breast cancer:  Diagnosed with Invasive ductal ca of left breast in 01/18, MRI and biopsy proven  S/P surgery, radiation. Defer to Hem/Onc team    Patient was asked to repeat EKG in the clinic today. EKG reviewed. She has sinus rhythm. Nonspecific IVCD. T wave inversion in 1 aVL V5 and V6 which was mentioned in EKG from 2019 at [facility].  QT interval was 400 ms. Overall EKG is unremarkable and unchanged from 2019 after her ACS. Discussed with the patient    This plan was discussed with Ms. Nestor Santos in detail, who is in agreement. Thank you Dr. Jesusita Islas for allowing me to participate in the patient's care. Feel free to call me with any questions or concerns.

## 2020-11-09 NOTE — PROGRESS NOTES
Aga Salinas presents today for   Chief Complaint   Patient presents with    Follow-up     Abnormal EKG       Aga Salinas preferred language for health care discussion is english/other. Personal Protective Equipment:   Personal Protective Equipment was used including: mask-surgical and hands-gloves. Patient was placed on no precaution(s). Patient was masked. Is someone accompanying this pt? No    Is the patient using any DME equipment during OV? No    Depression Screening:  3 most recent PHQ Screens 2/24/2020   Little interest or pleasure in doing things Not at all   Feeling down, depressed, irritable, or hopeless Not at all   Total Score PHQ 2 0       Learning Assessment:  Learning Assessment 8/17/2017   PRIMARY LEARNER Patient   PRIMARY LANGUAGE ENGLISH   LEARNER PREFERENCE PRIMARY DEMONSTRATION   ANSWERED BY Patient   RELATIONSHIP SELF       Abuse Screening:  No flowsheet data found. Fall Risk  Fall Risk Assessment, last 12 mths 2/24/2020   Able to walk? Yes   Fall in past 12 months? No       Pt currently taking Anticoagulant therapy? No    Coordination of Care:  1. Have you been to the ER, urgent care clinic since your last visit? Hospitalized since your last visit? [facility] 10[facility]2019 - Chest pain    2. Have you seen or consulted any other health care providers outside of the [address] [place] [address] since your last visit? Include any pap smears or colon screening.  No

## 2020-11-09 NOTE — LETTER
11/9/20 Patient: Dana Fernandez YOB: 1945 Date of Visit: 11/9/2020 Asif Dewitt MD 
[id number] [id number] VIA Facsimile: [phone] Dear Asif Dewitt MD, Thank you for referring Ms. Jessie Blanco to [address] [place] [address] for evaluation. My notes for this consultation are attached. If you have questions, please do not hesitate to call me. I look forward to following your patient along with you. Sincerely, Amanda Roman MD

## 2021-03-18 ENCOUNTER — OFFICE VISIT (OUTPATIENT)
Dept: CARDIOLOGY CLINIC | Age: 76
End: 2021-03-18
Payer: MEDICARE

## 2021-03-18 VITALS
SYSTOLIC BLOOD PRESSURE: 122 MMHG | BODY MASS INDEX: 27.1 KG/M2 | WEIGHT: 143.4 LBS | RESPIRATION RATE: 16 BRPM | DIASTOLIC BLOOD PRESSURE: 72 MMHG | OXYGEN SATURATION: 97 % | HEART RATE: 88 BPM

## 2021-03-18 DIAGNOSIS — E78.00 PURE HYPERCHOLESTEROLEMIA: ICD-10-CM

## 2021-03-18 DIAGNOSIS — I25.83 CORONARY ARTERY DISEASE DUE TO LIPID RICH PLAQUE: ICD-10-CM

## 2021-03-18 DIAGNOSIS — I25.10 CORONARY ARTERY DISEASE DUE TO LIPID RICH PLAQUE: ICD-10-CM

## 2021-03-18 DIAGNOSIS — I10 ESSENTIAL HYPERTENSION: Primary | ICD-10-CM

## 2021-03-18 DIAGNOSIS — I10 ESSENTIAL HYPERTENSION WITH GOAL BLOOD PRESSURE LESS THAN 140/90: ICD-10-CM

## 2021-03-18 PROCEDURE — 1090F PRES/ABSN URINE INCON ASSESS: CPT | Performed by: INTERNAL MEDICINE

## 2021-03-18 PROCEDURE — G8536 NO DOC ELDER MAL SCRN: HCPCS | Performed by: INTERNAL MEDICINE

## 2021-03-18 PROCEDURE — G8400 PT W/DXA NO RESULTS DOC: HCPCS | Performed by: INTERNAL MEDICINE

## 2021-03-18 PROCEDURE — G8752 SYS BP LESS 140: HCPCS | Performed by: INTERNAL MEDICINE

## 2021-03-18 PROCEDURE — G8419 CALC BMI OUT NRM PARAM NOF/U: HCPCS | Performed by: INTERNAL MEDICINE

## 2021-03-18 PROCEDURE — 1101F PT FALLS ASSESS-DOCD LE1/YR: CPT | Performed by: INTERNAL MEDICINE

## 2021-03-18 PROCEDURE — 99214 OFFICE O/P EST MOD 30 MIN: CPT | Performed by: INTERNAL MEDICINE

## 2021-03-18 PROCEDURE — G8510 SCR DEP NEG, NO PLAN REQD: HCPCS | Performed by: INTERNAL MEDICINE

## 2021-03-18 PROCEDURE — G8754 DIAS BP LESS 90: HCPCS | Performed by: INTERNAL MEDICINE

## 2021-03-18 PROCEDURE — G8428 CUR MEDS NOT DOCUMENT: HCPCS | Performed by: INTERNAL MEDICINE

## 2021-03-18 NOTE — LETTER
3/18/2021 Patient: Francia Holter YOB: 1945 Date of Visit: 3/18/2021 Jerry Nayak MD 
[id number] [facility] [address] [id number] Via Fax: [phone] Dear Jerry Nayak MD, Thank you for referring Ms. Navin Valenzuela to [address] [place] [address] for evaluation. My notes for this consultation are attached. If you have questions, please do not hesitate to call me. I look forward to following your patient along with you. Sincerely, Gloria Meléndez MD

## 2021-03-18 NOTE — PROGRESS NOTES
Yolanda Mccormick presents today for   Chief Complaint   Patient presents with    Follow-up     hospital cp       Yolanda Mccormick preferred language for health care discussion is english/other. Personal Protective Equipment:   Personal Protective Equipment was used including: mask-surgical and hands-gloves. Patient was placed on no precaution(s). Patient was masked. Is someone accompanying this pt? no    Is the patient using any DME equipment during [address]? no    Depression Screening:  3 most recent PHQ Screens 3/18/2021   Little interest or pleasure in doing things Not at all   Feeling down, depressed, irritable, or hopeless Not at all   Total Score PHQ 2 0       Learning Assessment:  Learning Assessment 8/17/2017   PRIMARY LEARNER Patient   PRIMARY LANGUAGE ENGLISH   LEARNER PREFERENCE PRIMARY DEMONSTRATION   ANSWERED BY Patient   RELATIONSHIP SELF       Abuse Screening:  No flowsheet data found. Fall Risk  Fall Risk Assessment, last 12 mths 3/18/2021   Able to walk? Yes   Fall in past 12 months? 0   Do you feel unsteady? 0   Are you worried about falling 0       Pt currently taking Anticoagulant therapy? plavix    Coordination of Care:  1. Have you been to the ER, urgent care clinic since your last visit? Hospitalized since your last visit? Yes cp    2. Have you seen or consulted any other health care providers outside of the [address] [place] [address] since your last visit? Include any pap smears or colon screening.  no

## 2021-03-18 NOTE — PROGRESS NOTES
Linda Rader is a 67 y.o. female with a history of coronary artery disease, status post inferior ST elevation MI and RCA drug-eluting stent in October 2011. She also has diabetes, hyperlipidemia and hypertension. Ms. Orly Muñoz is here today for follow up appointment. Patient had to go to emergency department about 2 weeks ago with chest pain with some mixed feature. She was admitted overnight. She had echocardiogram and nuclear stress test and was sent home for further medical management. Since she has gone home, she has been feeling fine without any symptoms concerning for angina or heart failure. She denies any use of nitroglycerin. She was told to have EKG performed in our clinic today by oncology team for follow-up. MH:  Past Medical History:   Diagnosis Date    Borderline diabetes     Diet controlled    CAD (coronary artery disease) 10/2011    S/P RCA (3/0 X 20 mm SYNERGY GOLDIE) 2019, RCA GOLDIE (3.0 X 18 mm Xience) 10/2011    HLD (hyperlipidemia)     HTN (hypertension)     Invasive ductal carcinoma of breast ([facility] [address] 02/2018    Left breast, Radiation    STEMI (ST elevation myocardial infarction) ([facility] [address]     Inferior STEMI (10/2011)     PSH:  Past Surgical History:   Procedure Laterality Date    CARDIAC CATHETERIZATION  10/27/2011    L heart cath;LAD 10%-20% luminal irreg; 99%stenosis of RCA using 3.0 x18-mm Xience     MEDS:  Current Outpatient Medications   Medication Sig Dispense Refill    atorvastatin (LIPITOR) 40 mg tablet Take 1 Tab by mouth daily. 90 Tab 3    clopidogreL (Plavix) 75 mg tab Take 1 Tab by mouth daily. 90 Tab 3    carvediloL (COREG) 3.125 mg tablet TAKE 1 TABLET BY MOUTH TWICE DAILY WITH MEALS 180 Tab 3    amLODIPine (NORVASC) 10 mg tablet Take 1 Tab by mouth daily. 90 Tab 3    aspirin delayed-release 81 mg tablet Take 1 Tab by mouth daily. 90 Tab 3    nitroglycerin (NITROSTAT) 0.4 mg SL tablet 1 Tab by SubLINGual route every five (5) minutes as needed for Chest Pain.  1 Bottle 1     Allergies and Sensitivities:  Allergies   Allergen Reactions    Pcn [Penicillins] Unknown (comments)     Family History:  Family History   Problem Relation Age of Onset    Heart Attack Mother     Heart Attack Father      Social History:  Social History     Tobacco Use    Smoking status: Former Smoker     Packs/day: 0.50     Years: 30.00     Pack years: 15.00     Quit date: 10/16/2011     Years since quittin.4    Smokeless tobacco: Never Used   Substance Use Topics    Alcohol use: No    Drug use: No     Review of Systems:  As above in HPI, otherwise 10 point review negative. Physical Exam:  BP Readings from Last 3 Encounters:   21 122/72   20 (!) 143/87   20 123/72       Pulse Readings from Last 3 Encounters:   21 88   20 78   20 77       Wt Readings from Last 3 Encounters:   21 143 lb 6.4 oz (65 kg)   20 142 lb 6.4 oz (64.6 kg)   20 125 lb (56.7 kg)     Constitutional: Oriented to person, place, and time. HENT: Head: Normocephalic and atraumatic. Neck: No JVD present. Cardiovascular: Regular rhythm. No murmur, gallop or rubs appriciated  Lung[de-identified] Breath sounds normal. No respiratory distress. No ronchi or rales appriciated  Abdominal: No tenderness. No rebound and no guarding. Musculoskeletal: There is no edema. No cynosis    Review of Data:  EKG:   Sinus rhythm at the rate of 75 beats per minute. There was Q wave in lead III. No dynamic ST-T changes of ischemia.     LABS:   Lab Results   Component Value Date/Time    Sodium 143 2012 12:00 AM    Potassium 4.6 2012 12:00 AM    Chloride 107 2012 12:00 AM    CO2 26 2012 12:00 AM    Glucose 108 (H) 2012 12:00 AM    BUN 11 2012 12:00 AM    Creatinine 0.57 2012 12:00 AM       Lab Results   Component Value Date/Time    Cholesterol, total 183 10/28/2011 01:20 AM    HDL Cholesterol 36 (L) 10/28/2011 01:20 AM    LDL, calculated 121.4 (H) 10/28/2011 01:20 AM    Triglyceride 128 10/28/2011 01:20 AM    CHOL/HDL Ratio 5.1 (H) 10/28/2011 01:20 AM       No components found for: GPT    Lab Results   Component Value Date/Time    Hemoglobin A1c 6.4 (H) 10/28/2011 01:20 AM     FLP (12/2011) at PCP  / TG 94, LDL 62, HDL 42    ECHO (10/19)  LOW NORMAL LEFT VENTRICULAR EJECTION FRACTION 49%. AKINESIS OF THE MID TO BASAL INFERIOR WALL AND BASAL INFEROSEPTAL WALL. HYPOKINESIS OF THE BASAL INFEROLATERAL WALL SEGMENT. ABNORMAL DIASTOLIC FILLING PATTERN FOR AGE.    ECHO (02/21)  NORMAL CHAMBER SIZES. MILD CONCENTRIC LEFT VENTRICULAR HYPERTROPHY. NORMAL GLOBAL LEFT VENTRICULAR SYSTOLIC FUNCTION CALCULATED BY SABILLON'S BI-PLANE OF 57%. MILD DIASTOLIC DYSFUNCTION. NORMAL RIGHT VENTRICULAR GLOBAL SYSTOLIC FUNCTION. MILD TRICUSPID AND PULMONIC REGURGITATION. ESTIMATED PULMONARY ARTERY PRESSURE IS 38 MMHG. STRESS TEST (02/21)  Evidence for posterior lateral scar with posterior inferior border zone   ischemia as described above. Normal LV volume. LVEF 45%. CATH (10/19)  - Left main coronary artery: mild irregularities, moderate focal disease in mid segment (40-50%)  - Left anterior descending coronary artery: mild diffuse disease  - Left circumflex coronary artery: small, at the junction of a posterolateral branch there is a beak that may represent an occlusion of an AV LCx.  There is an rPL that covers the lateral wall at least partially.    - Ramus intermediate artery: not present  - Right coronary artery: dominant, severe disease in prox/mid RCA. ANISHA/culprit     Successful Primary PCI of the proximal and mid RCA with a 3.0 x 20 mm Synergy EES (post-dilated with a 3.25 mm NC balloon)    Impression / Plan:  Ms. Milena Pelaez is a pleasant, 67 y.o. female with history of coronary artery disease, hypertension, hyperlipidemia, and borderline diabetes.     Coronary artery disease:   She has an inferior STEMI in October of 2011 requiring RCA drug-eluting stent.  She had ACS in October 2019, requiring another RCA stent. Nuclear stress test in 02/21 with predominantly fixed defect with border zone reversibility with EF 45% as mentioned above  Patient is doing well except this 1 episode. She is able to perform activity of daily living. Discussed further medical management versus invasive evaluation. After lengthy discussion patient would like to continue medical management as she is not having any chest pains. Risk-benefit, alternatives discussed in detail  Continue aspirin and Plavix   continue Coreg and statin    Hypertension:  Blood pressure is stable. H/O hyperkalemia from lisinopril in past. Not on ACE-I since then. Continue current medications    Hyperlipidemia:  Defer to primary care Continue same for now. She is on atorvastatin 40 mg daily. No side effect of medication. Being checked by PCP regularly    L Breast cancer:  Diagnosed with Invasive ductal ca of left breast in 01/18, MRI and biopsy proven  S/P surgery, radiation. Defer to Hem/Onc team    This plan was discussed with Ms. Milena Pelaez in detail, who is in agreement. Thank you Dr. Jossue Santos for allowing me to participate in the patient's care. Feel free to call me with any questions or concerns.

## 2021-10-18 RX ORDER — AMLODIPINE BESYLATE 10 MG/1
TABLET ORAL
Qty: 90 TABLET | Refills: 0 | Status: SHIPPED | OUTPATIENT
Start: 2021-10-18 | End: 2021-12-13

## 2021-10-19 ENCOUNTER — OFFICE VISIT (OUTPATIENT)
Dept: CARDIOLOGY CLINIC | Age: 76
End: 2021-10-19
Payer: MEDICARE

## 2021-10-19 VITALS
TEMPERATURE: 98.1 F | DIASTOLIC BLOOD PRESSURE: 80 MMHG | SYSTOLIC BLOOD PRESSURE: 139 MMHG | WEIGHT: 139 LBS | RESPIRATION RATE: 16 BRPM | OXYGEN SATURATION: 97 % | HEART RATE: 83 BPM | BODY MASS INDEX: 26.26 KG/M2

## 2021-10-19 DIAGNOSIS — I25.10 CORONARY ARTERY DISEASE DUE TO LIPID RICH PLAQUE: Primary | ICD-10-CM

## 2021-10-19 DIAGNOSIS — E78.00 PURE HYPERCHOLESTEROLEMIA: ICD-10-CM

## 2021-10-19 DIAGNOSIS — I25.83 CORONARY ARTERY DISEASE DUE TO LIPID RICH PLAQUE: Primary | ICD-10-CM

## 2021-10-19 DIAGNOSIS — I10 ESSENTIAL HYPERTENSION WITH GOAL BLOOD PRESSURE LESS THAN 140/90: ICD-10-CM

## 2021-10-19 PROCEDURE — G8752 SYS BP LESS 140: HCPCS | Performed by: INTERNAL MEDICINE

## 2021-10-19 PROCEDURE — 1101F PT FALLS ASSESS-DOCD LE1/YR: CPT | Performed by: INTERNAL MEDICINE

## 2021-10-19 PROCEDURE — 99214 OFFICE O/P EST MOD 30 MIN: CPT | Performed by: INTERNAL MEDICINE

## 2021-10-19 PROCEDURE — G8419 CALC BMI OUT NRM PARAM NOF/U: HCPCS | Performed by: INTERNAL MEDICINE

## 2021-10-19 PROCEDURE — G8428 CUR MEDS NOT DOCUMENT: HCPCS | Performed by: INTERNAL MEDICINE

## 2021-10-19 PROCEDURE — G8536 NO DOC ELDER MAL SCRN: HCPCS | Performed by: INTERNAL MEDICINE

## 2021-10-19 PROCEDURE — 1090F PRES/ABSN URINE INCON ASSESS: CPT | Performed by: INTERNAL MEDICINE

## 2021-10-19 PROCEDURE — G8754 DIAS BP LESS 90: HCPCS | Performed by: INTERNAL MEDICINE

## 2021-10-19 PROCEDURE — G8510 SCR DEP NEG, NO PLAN REQD: HCPCS | Performed by: INTERNAL MEDICINE

## 2021-10-19 PROCEDURE — G8400 PT W/DXA NO RESULTS DOC: HCPCS | Performed by: INTERNAL MEDICINE

## 2021-10-19 NOTE — LETTER
10/19/2021    Patient: Angelica Gambino   YOB: 1945   Date of Visit: 10/19/2021     Philipp Mcmillan MD  [address] [address]  Via Fax: [phone]     Corby Carcamo MD  [address] [address]  Via Fax: [phone]    Dear MD Corby Rod MD,      Thank you for referring Ms. Pantera Russo to [address] [place] [address] for evaluation. My notes for this consultation are attached. If you have questions, please do not hesitate to call me. I look forward to following your patient along with you.       Sincerely,    Sammy Gonzalez MD

## 2021-10-19 NOTE — PROGRESS NOTES
Rupinder Rodriguez presents today for   Chief Complaint   Patient presents with    Follow-up     6m       Rupinder Rodriguez preferred language for health care discussion is english/other. Personal Protective Equipment:   Personal Protective Equipment was used including: mask-surgical and hands-gloves. Patient was placed on no precaution(s). Patient was masked. Precautions:   Patient currently on None  Patient currently roomed with door closed    Is someone accompanying this pt? no    Is the patient using any DME equipment during [address]? no    Depression Screening:  3 most recent PHQ Screens 10/19/2021   Little interest or pleasure in doing things Not at all   Feeling down, depressed, irritable, or hopeless Not at all   Total Score PHQ 2 0       Learning Assessment:  Learning Assessment 8/17/2017   PRIMARY LEARNER Patient   PRIMARY LANGUAGE ENGLISH   LEARNER PREFERENCE PRIMARY DEMONSTRATION   ANSWERED BY Patient   RELATIONSHIP SELF       Abuse Screening:  No flowsheet data found. Fall Risk  Fall Risk Assessment, last 12 mths 10/19/2021   Able to walk? Yes   Fall in past 12 months? -   Do you feel unsteady? -   Are you worried about falling -       Pt currently taking Anticoagulant therapy? no    Coordination of Care:  1. Have you been to the ER, urgent care clinic since your last visit? Hospitalized since your last visit? no    2. Have you seen or consulted any other health care providers outside of the [address] [place] [address] since your last visit? Include any pap smears or colon screening.  no

## 2021-10-19 NOTE — PROGRESS NOTES
Dodson Cogan is a 67 y.o. female with a history of coronary artery disease, status post inferior ST elevation MI and RCA drug-eluting stent in October 2011. She also has diabetes, hyperlipidemia and hypertension. Ms. Alex Moya is here today for follow up appointment. Patient denies any symptoms to suggest angina or heart failure since last time. She denies any use of nitroglycerin since last visit. Denies any hospitalization    MH:  Past Medical History:   Diagnosis Date    Borderline diabetes     Diet controlled    CAD (coronary artery disease) 10/2011    S/P RCA (3/0 X 20 mm SYNERGY GOLDIE) 2019, RCA GOLDIE (3.0 X 18 mm Xience) 10/2011    HLD (hyperlipidemia)     HTN (hypertension)     Invasive ductal carcinoma of breast ([facility] [address] 02/2018    Left breast, Radiation    STEMI (ST elevation myocardial infarction) ([facility] [address]     Inferior STEMI (10/2011)     PSH:  Past Surgical History:   Procedure Laterality Date    CARDIAC CATHETERIZATION  10/27/2011    L heart cath;LAD 10%-20% luminal irreg; 99%stenosis of RCA using 3.0 x18-mm Xience     MEDS:  Current Outpatient Medications   Medication Sig Dispense Refill    amLODIPine (NORVASC) 10 mg tablet Take 1 tablet by mouth daily 90 Tablet 0    atorvastatin (LIPITOR) 40 mg tablet Take 1 Tab by mouth daily. 90 Tab 3    clopidogreL (Plavix) 75 mg tab Take 1 Tab by mouth daily. 90 Tab 3    carvediloL (COREG) 3.125 mg tablet TAKE 1 TABLET BY MOUTH TWICE DAILY WITH MEALS 180 Tab 3    aspirin delayed-release 81 mg tablet Take 1 Tab by mouth daily. 90 Tab 3    nitroglycerin (NITROSTAT) 0.4 mg SL tablet 1 Tab by SubLINGual route every five (5) minutes as needed for Chest Pain.  1 Bottle 1     Allergies and Sensitivities:  Allergies   Allergen Reactions    Pcn [Penicillins] Unknown (comments)     Family History:  Family History   Problem Relation Age of Onset    Heart Attack Mother     Heart Attack Father      Social History:  Social History     Tobacco Use    Smoking status: Former Smoker     Packs/day: 0.50     Years: 30.00     Pack years: 15.00     Quit date: 10/16/2011     Years since quitting: 10.0    Smokeless tobacco: Never Used   Substance Use Topics    Alcohol use: No    Drug use: No     Review of Systems:  As above in HPI, otherwise 10 point review negative. Physical Exam:  BP Readings from Last 3 Encounters:   10/19/21 139/80   03/18/21 122/72   11/09/20 (!) 143/87       Pulse Readings from Last 3 Encounters:   10/19/21 83   03/18/21 88   11/09/20 78       Wt Readings from Last 3 Encounters:   10/19/21 63 kg (139 lb)   03/18/21 65 kg (143 lb 6.4 oz)   11/09/20 64.6 kg (142 lb 6.4 oz)     Constitutional: Oriented to person, place, and time. HENT: Head: Normocephalic and atraumatic. Neck: No JVD present. Cardiovascular: Regular rhythm. No murmur, gallop or rubs appriciated  Lung[de-identified] Breath sounds normal. No respiratory distress. No ronchi or rales appriciated  Abdominal: No tenderness. No rebound and no guarding. Musculoskeletal: There is no edema. No cynosis    Review of Data:  EKG:   Sinus rhythm at the rate of 75 beats per minute. There was Q wave in lead III. No dynamic ST-T changes of ischemia.     LABS:   Lab Results   Component Value Date/Time    Sodium 143 02/22/2012 12:00 AM    Potassium 4.6 02/22/2012 12:00 AM    Chloride 107 02/22/2012 12:00 AM    CO2 26 02/22/2012 12:00 AM    Glucose 108 (H) 02/22/2012 12:00 AM    BUN 11 02/22/2012 12:00 AM    Creatinine 0.57 02/22/2012 12:00 AM       Lab Results   Component Value Date/Time    Cholesterol, total 183 10/28/2011 01:20 AM    HDL Cholesterol 36 (L) 10/28/2011 01:20 AM    LDL, calculated 121.4 (H) 10/28/2011 01:20 AM    Triglyceride 128 10/28/2011 01:20 AM    CHOL/HDL Ratio 5.1 (H) 10/28/2011 01:20 AM       No components found for: GPT    Lab Results   Component Value Date/Time    Hemoglobin A1c 6.4 (H) 10/28/2011 01:20 AM     FLP (12/2011) at PCP  / TG 94, LDL 62, HDL 42    ECHO (10/19)  LOW NORMAL LEFT VENTRICULAR EJECTION FRACTION 49%. AKINESIS OF THE MID TO BASAL INFERIOR WALL AND BASAL INFEROSEPTAL WALL. HYPOKINESIS OF THE BASAL INFEROLATERAL WALL SEGMENT. ABNORMAL DIASTOLIC FILLING PATTERN FOR AGE.    ECHO (02/21)  NORMAL CHAMBER SIZES. MILD CONCENTRIC LEFT VENTRICULAR HYPERTROPHY. NORMAL GLOBAL LEFT VENTRICULAR SYSTOLIC FUNCTION CALCULATED BY SABILLON'S BI-PLANE OF 57%. MILD DIASTOLIC DYSFUNCTION. NORMAL RIGHT VENTRICULAR GLOBAL SYSTOLIC FUNCTION. MILD TRICUSPID AND PULMONIC REGURGITATION. ESTIMATED PULMONARY ARTERY PRESSURE IS 38 MMHG. STRESS TEST (02/21)  Evidence for posterior lateral scar with posterior inferior border zone   ischemia as described above. Normal LV volume. LVEF 45%. CATH (10/19)  - Left main coronary artery: mild irregularities, moderate focal disease in mid segment (40-50%)  - Left anterior descending coronary artery: mild diffuse disease  - Left circumflex coronary artery: small, at the junction of a posterolateral branch there is a beak that may represent an occlusion of an AV LCx.  There is an rPL that covers the lateral wall at least partially.    - Ramus intermediate artery: not present  - Right coronary artery: dominant, severe disease in prox/mid RCA. ANISHA/culprit     Successful Primary PCI of the proximal and mid RCA with a 3.0 x 20 mm Synergy EES (post-dilated with a 3.25 mm NC balloon)    Impression / Plan:  Ms. Earney Severs is a pleasant, 67 y.o. female with history of coronary artery disease, hypertension, hyperlipidemia, and borderline diabetes. Coronary artery disease:   She has an inferior STEMI in October of 2011 requiring RCA drug-eluting stent. She had ACS in October 2019, requiring another RCA stent. Nuclear stress test in 02/21 with predominantly fixed defect with border zone reversibility with EF 45% as mentioned above  Patient is doing well and denies any symptoms to suggest angina.   No use of nitroglycerin since last time  Continue aspirin and Plavix   continue Coreg and statin    Hypertension:  Blood pressure is stable. /80  H/O hyperkalemia from lisinopril in past. Not on ACE-I since then. Continue current medications    Hyperlipidemia:  Defer to primary care Continue same for now. She is on atorvastatin 40 mg daily. No side effect of medication. Being checked by PCP regularly    L Breast cancer:  Diagnosed with Invasive ductal ca of left breast in 01/18, MRI and biopsy proven  S/P surgery, radiation. Defer to Hem/Onc team    This plan was discussed with Ms. Shyla Ahmadi in detail, who is in agreement. Thank you Dr. Domenic Olivarez for allowing me to participate in the patient's care. Feel free to call me with any questions or concerns.

## 2021-12-13 RX ORDER — AMLODIPINE BESYLATE 10 MG/1
TABLET ORAL
Qty: 90 TABLET | Refills: 0 | Status: SHIPPED | OUTPATIENT
Start: 2021-12-13

## 2021-12-20 RX ORDER — CLOPIDOGREL BISULFATE 75 MG/1
TABLET ORAL
Qty: 90 TABLET | Refills: 0 | Status: SHIPPED | OUTPATIENT
Start: 2021-12-20